# Patient Record
Sex: FEMALE | Race: WHITE | NOT HISPANIC OR LATINO | Employment: STUDENT | RURAL
[De-identification: names, ages, dates, MRNs, and addresses within clinical notes are randomized per-mention and may not be internally consistent; named-entity substitution may affect disease eponyms.]

---

## 2019-08-14 ENCOUNTER — OFFICE VISIT (OUTPATIENT)
Dept: FAMILY MEDICINE CLINIC | Facility: CLINIC | Age: 34
End: 2019-08-14

## 2019-08-14 VITALS
BODY MASS INDEX: 20.94 KG/M2 | WEIGHT: 125.7 LBS | OXYGEN SATURATION: 98 % | HEART RATE: 70 BPM | RESPIRATION RATE: 20 BRPM | SYSTOLIC BLOOD PRESSURE: 100 MMHG | DIASTOLIC BLOOD PRESSURE: 60 MMHG | TEMPERATURE: 98.6 F | HEIGHT: 65 IN

## 2019-08-14 DIAGNOSIS — Z13.29 SCREENING FOR THYROID DISORDER: ICD-10-CM

## 2019-08-14 DIAGNOSIS — Z13.220 SCREENING FOR LIPOID DISORDERS: ICD-10-CM

## 2019-08-14 DIAGNOSIS — J30.2 SEASONAL ALLERGIES: ICD-10-CM

## 2019-08-14 DIAGNOSIS — S62.661A CLOSED NONDISPLACED FRACTURE OF DISTAL PHALANX OF LEFT INDEX FINGER, INITIAL ENCOUNTER: ICD-10-CM

## 2019-08-14 DIAGNOSIS — L98.9 SKIN LESION: ICD-10-CM

## 2019-08-14 DIAGNOSIS — S69.92XA JAMMED INTERPHALANGEAL JOINT OF FINGER OF LEFT HAND, INITIAL ENCOUNTER: ICD-10-CM

## 2019-08-14 DIAGNOSIS — Z00.00 WELL ADULT EXAM: Primary | ICD-10-CM

## 2019-08-14 DIAGNOSIS — Z13.1 SCREENING FOR DIABETES MELLITUS: ICD-10-CM

## 2019-08-14 DIAGNOSIS — R12 HEARTBURN: ICD-10-CM

## 2019-08-14 DIAGNOSIS — F41.9 ANXIETY: ICD-10-CM

## 2019-08-14 PROCEDURE — 99203 OFFICE O/P NEW LOW 30 MIN: CPT | Performed by: NURSE PRACTITIONER

## 2019-08-14 RX ORDER — ESCITALOPRAM OXALATE 10 MG/1
10 TABLET ORAL DAILY
Qty: 90 TABLET | Refills: 1 | Status: SHIPPED | OUTPATIENT
Start: 2019-08-14 | End: 2020-07-24

## 2019-08-14 RX ORDER — CETIRIZINE HYDROCHLORIDE 10 MG/1
10 TABLET ORAL DAILY
Qty: 30 TABLET | Refills: 5 | Status: SHIPPED | OUTPATIENT
Start: 2019-08-14 | End: 2020-04-30 | Stop reason: SDUPTHER

## 2019-08-14 RX ORDER — DIPHENHYDRAMINE HCL 25 MG
25 TABLET ORAL WEEKLY
COMMUNITY

## 2019-08-23 ENCOUNTER — TELEPHONE (OUTPATIENT)
Dept: FAMILY MEDICINE CLINIC | Facility: CLINIC | Age: 34
End: 2019-08-23

## 2019-08-23 NOTE — TELEPHONE ENCOUNTER
----- Message from ELIZABETH Eldridge sent at 8/18/2019 10:32 PM CDT -----  Nondisplaced intra-articular fracture of the lateral aspect base of the distal phalanx of the index finger.  We need to call orthopedic and see if they can see her in the [facility] ASAP.

## 2019-08-23 NOTE — TELEPHONE ENCOUNTER
----- Message from ELIZABETH Eldridge sent at 8/18/2019 10:32 PM CDT -----  Nondisplaced intra-articular fracture of the lateral aspect base of the distal phalanx of the index finger.  We need to call orthopedic and see if they can see her in the [facility] ASAP.

## 2019-08-23 NOTE — TELEPHONE ENCOUNTER
----- Message from ELIZABETH Eldridge sent at 8/18/2019 10:32 PM CDT -----  Nondisplaced intra-articular fracture of the lateral aspect base of the distal phalanx of the index finger.  We need to call orthopedic and see if they can see her in the [facility] ASAP.

## 2019-08-23 NOTE — TELEPHONE ENCOUNTER
----- Message from ELIZABETH Eldridge sent at 8/18/2019 10:32 PM CDT -----  Nondisplaced intra-articular fracture of the lateral aspect base of the distal phalanx of the index finger.  We need to call orthopedic and see if they can see her in the [facility] ASAP.

## 2019-08-26 DIAGNOSIS — R52 PAIN: Primary | ICD-10-CM

## 2019-08-27 ENCOUNTER — OFFICE VISIT (OUTPATIENT)
Dept: ORTHOPEDIC SURGERY | Facility: CLINIC | Age: 34
End: 2019-08-27

## 2019-08-27 VITALS — WEIGHT: 124 LBS | HEIGHT: 65 IN | BODY MASS INDEX: 20.66 KG/M2

## 2019-08-27 DIAGNOSIS — M79.645 FINGER PAIN, LEFT: ICD-10-CM

## 2019-08-27 DIAGNOSIS — S62.661A NONDISPLACED FRACTURE OF DISTAL PHALANX OF LEFT INDEX FINGER, INITIAL ENCOUNTER FOR CLOSED FRACTURE: Primary | ICD-10-CM

## 2019-08-27 PROCEDURE — 99214 OFFICE O/P EST MOD 30 MIN: CPT | Performed by: NURSE PRACTITIONER

## 2019-08-27 PROCEDURE — 26750 TREAT FINGER FRACTURE EACH: CPT | Performed by: NURSE PRACTITIONER
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
## 2019-08-28 ENCOUNTER — HOSPITAL ENCOUNTER (OUTPATIENT)
Dept: PHYSICAL THERAPY | Facility: HOSPITAL | Age: 34
Setting detail: THERAPIES SERIES
Discharge: HOME | End: 2019-08-28

## 2019-08-28 DIAGNOSIS — S62.661A NONDISPLACED FRACTURE OF DISTAL PHALANX OF LEFT INDEX FINGER, INITIAL ENCOUNTER FOR CLOSED FRACTURE: Primary | ICD-10-CM

## 2019-08-28 DIAGNOSIS — M79.645 FINGER PAIN, LEFT: ICD-10-CM

## 2019-08-28 PROCEDURE — 97110 THERAPEUTIC EXERCISES: CPT | Performed by: PHYSICAL THERAPIST

## 2019-08-28 PROCEDURE — 97162 PT EVAL MOD COMPLEX 30 MIN: CPT | Performed by: PHYSICAL THERAPIST
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
## 2019-08-29 ENCOUNTER — HOSPITAL ENCOUNTER (OUTPATIENT)
Dept: PHYSICAL THERAPY | Facility: HOSPITAL | Age: 34
Discharge: HOME | End: 2019-08-29
Admitting: NURSE PRACTITIONER

## 2019-08-29 DIAGNOSIS — M79.645 FINGER PAIN, LEFT: ICD-10-CM

## 2019-08-29 DIAGNOSIS — S62.661A NONDISPLACED FRACTURE OF DISTAL PHALANX OF LEFT INDEX FINGER, INITIAL ENCOUNTER FOR CLOSED FRACTURE: Primary | ICD-10-CM

## 2019-08-29 PROCEDURE — 97110 THERAPEUTIC EXERCISES: CPT

## 2019-08-29 NOTE — THERAPY TREATMENT NOTE
Outpatient Physical Therapy Ortho Treatment Note  [facility]     Patient Name: Ana Martinez  : 1985  MRN: [id number]  Today's Date: 2019      Visit Date: 2019  Pt reports 5/10 pain pre treatment, 0/10 pain post treatment  Reports 0% of improvement.  Attended 2/2 visits.  Insurance available:Precert after 20 visits   Next MD appt: TBOLIVIER .  Recertification: 2019.  Visit Dx:    ICD-10-CM ICD-9-CM   1. Nondisplaced fracture of distal phalanx of left index finger, initial encounter for closed fracture S62.661A 816.02   2. Finger pain, left M79.645 729.5       Patient Active Problem List   Diagnosis   • Finger pain, left   • Nondisplaced fracture of distal phalanx of left index finger, initial encounter for closed fracture        Past Medical History:   Diagnosis Date   • Anxiety    • Cold sore    • Heartburn         Past Surgical History:   Procedure Laterality Date   • SPLENECTOMY     • TUBAL ABDOMINAL LIGATION         PT Ortho     Row Name 19 1400       Subjective Comments    Subjective Comments  pt stated pain work her up during the night. pt reported that she has been doing HEP . Pt came in 15 mins late for appt today. Pt worked over per patient.  -TL       Precautions and Contraindications    Precautions/Limitations  no known precautions/limitations  -TL       Subjective Pain    Able to rate subjective pain?  yes  -TL    Pre-Treatment Pain Level  5  -TL    Post-Treatment Pain Level  0  -TL    Row Name 19 1700        Strength Right    # Reps  2  -AJ    Right Rung  4  -AJ    Right  Test 1  50  -AJ    Right  Test 2  53  -AJ     Strength Average Right  51.5  -AJ        Strength Left    # Reps  2  -AJ    Left Rung  4  -AJ    Left  Test 1  15  -AJ    Left  Test 2  12  -AJ     Strength Average Left  13.5  -AJ    Row Name 19 1600       Subjective Comments    Subjective Comments  Patient wishes to not have long term  "affects in the finger and be able ot bend it again.  -AJ       Precautions and Contraindications    Precautions/Limitations  no known precautions/limitations  -       Subjective Pain    Able to rate subjective pain?  yes  -AJ    Pre-Treatment Pain Level  2  -AJ    Post-Treatment Pain Level  -- \"sore\"  -       Posture/Observations    Posture- WNL  Posture is WNL for B hands  -AJ    Posture/Observations Comments  No distress, guarding of L IF, keeps it straight out.  -AJ       Special Tests/Palpation    Special Tests/Palpation  -- No areas of TTP.  -AJ       Left Upper Ext    Lt Wrist Flexion AROM  82°  -AJ    Lt Wrist Extension AROM  78°  -AJ    Lt  Ulnar Deviation AROM  42°  with increase in pain.  -AJ    Lt  Radial Deviation AROM  22°  -AJ       Left Fingers    LT Fingers PIP Flexion AROM  65°  -AJ    LT Fingers DIP Flexion AROM  35°  -AJ    LT FINGERS COMMENTS  All measuremeants fpor L hand WNL, except index finger which is listed above.  -       MMT (Manual Muscle Testing)    General MMT Comments  B wrists 5/5.  -AJ        Strength Right    # Reps  2  -AJ    Right Rung  2  -AJ    Right  Test 1  70  -AJ    Right  Test 2  66  -AJ     Strength Average Right  68  -AJ        Strength Left    # Reps  2  -AJ    Left Rung  2  -AJ    Left  Test 1  20  -AJ    Left  Test 2  17  -AJ     Strength Average Left  18.5  -AJ       Sensation    Sensation WNL?  WNL  -AJ    Light Touch  No apparent deficits  -AJ    Additional Comments  Denies any numbness or tingling.  -       Transfers    Comment (Transfers)  I with all transfers.  -       Gait/Stairs Assessment/Training    Comment (Gait/Stairs)  FWB, non-antalgicg, normla arm swing with gait.  -AJ       Hand  Strength     Strength Affected Side  Bilateral  -      User Key  (r) = Recorded By, (t) = Taken By, (c) = Cosigned By    Initials Name Provider Type    AJ Mesha Pope, PT DPT Physical Therapist    Angie Fung " J, PTA Physical Therapy Assistant           Hand Therapy (last 24 hours)      Hand Eval     Row Name 08/28/19 1600             Right Hand Strength - Pinch (lbs)    Lateral  19 lbs  -AJ      Three Jaw Eulalio  21 lbs  -AJ      Tip Pinch - Index Finger  14 lbs  -AJ         Left Hand Strength - Pinch (lbs)    Lateral  12 lbs  -AJ      Three Jaw Eulalio  14 lbs  -AJ      Tip Pinch - Index Finger  7 lbs  -AJ        User Key  (r) = Recorded By, (t) = Taken By, (c) = Cosigned By    Initials Name Provider Type    Mesha Alicea, PT DPT Physical Therapist                    PT Assessment/Plan     Row Name 08/29/19 1400 08/28/19 1600       PT Assessment    Functional Limitations  --  Limitation in home management;Limitations in community activities;Performance in leisure activities;Performance in self-care ADL;Performance in work activities  -    Impairments  --  Dexterity;Endurance;Impaired flexibility;Impaired muscle endurance;Range of motion;Sensation;Pain;Muscle strength;Joint mobility;Impaired muscle power  -    Assessment Comments  No new goals met at this time. pt reported that she has been doing her HEP. Pt stated finger woke her up at night since starting ex. Encourage pt to use ice to help eith decrease pain. pt tolerated new ex this date of wrist flex/ext stretches , RD/UD arom. PTA added pinching to HEP with RTB. Pt tolerated pro ll UE well.   -TL  Patient exhibits difficulty with functional activites and decreas ein ROM,strength and would beenfit form PT.  -    Rehab Potential  --  Excellent  -    Patient/caregiver participated in establishment of treatment plan and goals  --  Yes  -    Patient would benefit from skilled therapy intervention  --  Yes  -       PT Plan    PT Frequency  2x/week  -TL  2x/week  -    Predicted Duration of Therapy Intervention (Therapy Eval)  --  6-12 visits  -    Planned CPT's?  --  PT EVAL LOW COMPLEXITY: 33002;PT RE-EVAL: 76265;PT THER PROC EA 15 MIN: 29529;PT  "THER ACT EA 15 MIN: 92007;PT MANUAL THERAPY EA 15 MIN: 55879;PT PARAFFIN BATH: 68782;PT THER SUPP EA 15 MIN  -AJ    Physical Therapy Interventions (Optional Details)  --  fine motor skills;gross motor skills;home exercise program;joint mobilization;manual therapy techniques;modalities;patient/family education;ROM (Range of Motion);strengthening;stretching  -    PT Plan Comments  add web flex next visit and continue with ROm.  -TL  progress overall ROM, strength, function.  -      User Key  (r) = Recorded By, (t) = Taken By, (c) = Cosigned By    Initials Name Provider Type    Mesha Alicea, PT DPT Physical Therapist    Angie Fung PTA Physical Therapy Assistant          Modalities     Row Name 08/29/19 1400 08/28/19 1600          Moist Heat    MH Prior to Rx  --  No  -AJ     MH S/P Rx  --  No  -AJ        Ice    Ice Applied  Yes  -TL  --     Location  left Index finger  -TL  --     Rx Minutes  15 mins  -TL  --     Ice S/P Rx  Yes  -TL  --       User Key  (r) = Recorded By, (t) = Taken By, (c) = Cosigned By    Initials Name Provider Type    Mesha Alicea, PT DPT Physical Therapist    Angie Fung PTA Physical Therapy Assistant        Exercises     Row Name 08/29/19 1400 08/28/19 1600          Subjective Comments    Subjective Comments  pt stated pain work her up during the night. pt reported that she has been doing HEP . Pt came in 15 mins late for appt today. Pt worked over per patient.  -TL  Patient wishes to not have long term affects in the finger and be able ot bend it again.  -        Subjective Pain    Able to rate subjective pain?  yes  -TL  yes  -AJ     Pre-Treatment Pain Level  5  -TL  2  -AJ     Post-Treatment Pain Level  0  -TL  -- \"sore\"  -        Exercise 1    Exercise Name 1  pro ll UE fwd/rev  -TL  L IF DIP blocking AROM  -AJ     Reps 1  --  20  -AJ     Time 1  10mins   -TL  3-5\" hold  -AJ     Additional Comments  gripping and rom/level 3  -TL  --        " "Exercise 2    Exercise Name 2  Wrist flexion S  -TL  L IF PIP blocking AROM  -AJ     Reps 2  2  -TL  20  -AJ     Time 2  1 min each  -TL  3-5\" hold  -AJ        Exercise 3    Exercise Name 3  wrisit ext S  -TL  Claws  -AJ     Reps 3  2  -TL  20  -AJ     Time 3  1 min  -TL  5\" hold  -AJ        Exercise 4    Exercise Name 4  Arom wrist RD  -TL  Fists  -AJ     Sets 4  2  -TL  20  -AJ     Reps 4  10  -TL  5\" hold  -AJ     Time 4  5 sec hold  -TL  --        Exercise 5    Exercise Name 5  AROM wrist UD  -TL  Putty:   -AJ     Sets 5  2  -TL  --     Reps 5  10  -TL  --     Time 5  --  5 minutes  -AJ     Additional Comments  --  Red  -AJ        Exercise 6    Exercise Name 6  L IF pip blocking   -TL  --     Reps 6  20  -TL  --     Time 6  3-5 sec hold  -TL  --        Exercise 7    Exercise Name 7  claws  -TL  --     Reps 7  20  -TL  --     Time 7  5 sec hold  -TL  --     Additional Comments  reviewed  -TL  --        Exercise 8    Exercise Name 8  fisting  -TL  --     Reps 8  20  -TL  --     Time 8  5 sec hold  -TL  --     Additional Comments  review  -TL  --        Exercise 9    Exercise Name 9  pinching activity clothes pins  -TL  --     Time 9  3mins  -TL  --        Exercise 10    Exercise Name 10  fisting putty  -TL  --     Time 10  5min  -TL  --     Additional Comments  red  -TL  --        Exercise 11    Exercise Name 11  pinching with red putty  -TL  --     Time 11  3min  -TL  --       User Key  (r) = Recorded By, (t) = Taken By, (c) = Cosigned By    Initials Name Provider Type    Mesha Alicea, PT DPT Physical Therapist    TL Angie Kelley, PTA Physical Therapy Assistant                       PT OP Goals     Row Name 08/29/19 1400 08/28/19 1600       PT Short Term Goals    STG Date to Achieve  09/18/19  -TL  09/18/19  -AJ    STG 1  I with HEP and have additoins/changes by next recertification.  -TL  I with HEP and have additoins/changes by next recertification.  -AJ    STG 1 Progress  " Ongoing;Progressing  -TL  --    STG 2  AROM L wrist all WNL, no increase in pain.  -TL  AROM L wrist all WNL, no increase in pain.  -AJ    STG 2 Progress  Ongoing  -TL  --    STG 3  AROm L IF PIP flexion >= 90°.  -TL  AROm L IF PIP flexion >= 90°.  -    STG 3 Progress  Ongoing  -TL  --    STG 4  AROm L IF DIP flexion >= 65°.  -TL  AROm L IF DIP flexion >= 65°.  -    STG 4 Progress  Ongoing  -TL  --    STG 5  L  at the #2 setting >= 45#.  -TL  L  at the #2 setting >= 45#.  -    STG 5 Progress  Ongoing  -TL  --       Long Term Goals    LTG Date to Achieve  10/11/19  -TL  10/11/19  -    LTG 1  patient able to make a full composite fist with l ahnd and able to touch tip to fat pad with IF.  -TL  patient able to make a full composite fist with l ahnd and able to touch tip to fat pad with IF.  -    LTG 2  L  at the #2 setting >= 65#.  -TL  L  at the #2 setting >= 65#.  -    LTG 3  L  at the #4 setting >= 40#.  -TL  L  at the #4 setting >= 40#.  -    LTG 4  L lateral pinch within 2# of contralateral hand.  -TL  L lateral pinch within 2# of contralateral hand.  -    LTG 5  L 3-jaw lateral pinch within 2# of contralateral hand.  -TL  L 3-jaw lateral pinch within 2# of contralateral hand.  -    LTG 6  L tip lateral pinch within 2# of contralateral hand.  -TL  L tip lateral pinch within 2# of contralateral hand.  -    LTG 7  Patient able to perform vibration activites with no increase in pain.  -TL  Patient able to perform vibration activites with no increase in pain.  -    LTG 8  I with final HEP.  -TL  I with final HEP.  -    LTG 9  D/C with a final HEp and free 30 day fitness formula memembership.  -TL  D/C with a final HEp and free 30 day fitness formula memembership.  -       Time Calculation    PT Goal Re-Cert Due Date  09/18/19  -TL  09/18/19  -      User Key  (r) = Recorded By, (t) = Taken By, (c) = Cosigned By    Initials Name Provider Type    Mesha Alicea  Garth, PT DPT Physical Therapist    TL Angie Kelley, JT Physical Therapy Assistant          Therapy Education  Education Details: wrist flexion and ext s, putty pinch  Given: HEP, Symptoms/condition management, Pain management  Program: New, Reinforced  How Provided: Verbal, Demonstration, Written  Provided to: Patient  Level of Understanding: Teach back education performed, Verbalized, Demonstrated              Time Calculation:   Start Time: 1358  Stop Time: 1453  Time Calculation (min): 55 min  PT Non-Billable Time (min): 10 min  Total Timed Code Minutes- PT: 45 minute(s)  Therapy Charges for Today     Code Description Service Date Service Provider Modifiers Qty    [id number]  PT THER PROC EA 15 MIN 8/29/2019 Angie Kelley, JT GP 3    [id number] HC PT THER SUPP EA 15 MIN 8/29/2019 Angie Kelley PTA GP 1                    Angie Kelley PTA  8/29/2019

## 2019-09-03 NOTE — PROGRESS NOTES
Subjective   Ana Martinez is a 34 y.o. female.     She presents today to [facility] care.  She does have a few concerns today in the office.  She reports that she is suffering from allergy symptoms and would like something to help with this if possible today in the office.  She has previously been taking Claritin but it has not been beneficial for her symptoms.  She also suffers from chronic anxiety symptoms.  She is interested in something she can take daily to help manage the symptoms.  She also has complaints of heartburn symptoms today in the office.  She would like a referral to dermatology for his full skin assessment.  She also has complaints of pain and swelling in her left index finger.  She reports that she jammed her finger a few weeks ago, however, the pain has remained the same.  She would like to have this evaluated today in the office.  She is due for repeat routine fasting labs.  She is otherwise without any other new complaints today in the office.      Anxiety   Presents for initial visit. Onset was more than 5 years ago. The problem has been unchanged. Symptoms include excessive worry, insomnia, irritability, nervous/anxious behavior, panic and restlessness. Patient reports no chest pain, compulsions, decreased concentration, depressed mood, dizziness, dry mouth, feeling of choking, hyperventilation, impotence, malaise, muscle tension, nausea, obsessions, palpitations, shortness of breath or suicidal ideas. Symptoms occur constantly. The severity of symptoms is interfering with daily activities. The quality of sleep is fair.     Her past medical history is significant for anxiety/panic attacks. Compliance with prior treatments has been variable.   Hand Pain    The incident occurred more than 1 week ago. The injury mechanism was a direct blow. The pain is present in the left fingers. The quality of the pain is described as aching. The pain does not radiate. The pain is mild. The pain has  been constant since the incident. Pertinent negatives include no chest pain, muscle weakness, numbness or tingling. The symptoms are aggravated by movement and palpation. The treatment provided no relief.        The following portions of the patient's history were reviewed and updated as appropriate: allergies, current medications, past family history, past medical history, past social history, past surgical history and problem list.    Review of Systems   Constitutional: Positive for irritability.   HENT: Positive for congestion and sneezing.    Eyes: Negative.    Respiratory: Negative.  Negative for shortness of breath.    Cardiovascular: Negative.  Negative for chest pain and palpitations.   Gastrointestinal: Negative.  Negative for nausea.   Genitourinary: Negative for impotence.   Musculoskeletal: Negative.    Skin: Negative.    Allergic/Immunologic: Negative.    Neurological: Negative.  Negative for dizziness, tingling and numbness.   Hematological: Negative.    Psychiatric/Behavioral: Positive for stress. Negative for decreased concentration, suicidal ideas and depressed mood. The patient is nervous/anxious and has insomnia.        Objective   Physical Exam   Constitutional: She is oriented to person, place, and time. Vital signs are normal. She appears well-developed and well-nourished. No distress.   HENT:   Head: Normocephalic.   Right Ear: External ear normal.   Left Ear: External ear normal.   Nose: Congestion present.   Mouth/Throat: Oropharynx is clear and moist. No oropharyngeal exudate.   Eyes: Conjunctivae and EOM are normal. Pupils are equal, round, and reactive to light. Right eye exhibits no discharge. Left eye exhibits no discharge.   Neck: Normal range of motion. Neck supple. No tracheal deviation present. No thyromegaly present.   Cardiovascular: Normal rate, regular rhythm and normal heart sounds. Exam reveals no gallop and no friction rub.   No murmur heard.  Pulmonary/Chest: Effort normal  and breath sounds normal. No respiratory distress. She has no wheezes. She has no rales. She exhibits no tenderness.   Musculoskeletal: Normal range of motion.        Left hand: She exhibits tenderness, bony tenderness and swelling.   Lymphadenopathy:     She has no cervical adenopathy.   Neurological: She is alert and oriented to person, place, and time.   Skin: Skin is warm and dry. Capillary refill takes less than 2 seconds. No rash noted. She is not diaphoretic. No erythema. No pallor.   Psychiatric: She has a normal mood and affect. Her behavior is normal. Judgment and thought content normal.   Nursing note and vitals reviewed.        Assessment/Plan   Ana was seen today for establish care and anxiety.    Diagnoses and all orders for this visit:    Well adult exam  -     CBC & Differential  -     Comprehensive Metabolic Panel  -     Hemoglobin A1c  -     Lipid Panel  -     TSH  -     Vitamin D 25 Hydroxy    Jammed interphalangeal joint of finger of left hand, initial encounter  -     XR Hand 3+ View Left; Future    Anxiety  -     CBC & Differential  -     Comprehensive Metabolic Panel  -     Hemoglobin A1c  -     Lipid Panel  -     TSH  -     Vitamin D 25 Hydroxy    Heartburn  -     escitalopram (LEXAPRO) 10 MG tablet; Take 1 tablet by mouth Daily.    Screening for diabetes mellitus  -     Hemoglobin A1c    Screening for lipoid disorders  -     Lipid Panel    Screening for thyroid disorder  -     TSH    Seasonal allergies  -     cetirizine (zyrTEC) 10 MG tablet; Take 1 tablet by mouth Daily.    Skin lesion  -     Ambulatory Referral to Dermatology    Closed nondisplaced fracture of distal phalanx of left index finger, initial encounter  -     Ambulatory Referral to Orthopedic Surgery               Patient's Body mass index is 20.92 kg/m². BMI is within normal parameters. No follow-up required..    Fasting labs.  X-ray following office visit.  Referral to dermatology for a skin assessment.  Zyrtec daily for  allergy symptoms.  Start on Lexapro once daily for anxiety.  Referral to orthopedics for nondisplaced fracture of the distal phalanx.  Continue current medications.  Follow up in 4 weeks for routine follow up.  Follow up sooner for problems/concerns.  Patient verbalized understanding and agreement with plan of care.        This document has been electronically signed by ELIZABETH Eldridge on September 2, 2019 10:39 PM

## 2019-09-04 ENCOUNTER — TREATMENT (OUTPATIENT)
Dept: PHYSICAL THERAPY | Facility: CLINIC | Age: 34
End: 2019-09-04

## 2019-09-04 DIAGNOSIS — S62.661A NONDISPLACED FRACTURE OF DISTAL PHALANX OF LEFT INDEX FINGER, INITIAL ENCOUNTER FOR CLOSED FRACTURE: Primary | ICD-10-CM

## 2019-09-04 DIAGNOSIS — M79.645 FINGER PAIN, LEFT: ICD-10-CM

## 2019-09-04 PROCEDURE — 97110 THERAPEUTIC EXERCISES: CPT | Performed by: PHYSICAL THERAPIST

## 2019-09-04 NOTE — PROGRESS NOTES
Physical Therapy Daily Progress Note    Patient: Ana Martinez   : 1985  Diagnosis/ICD-10 Code:     Diagnosis Plan   1. Nondisplaced fracture of distal phalanx of left index finger, initial encounter for closed fracture     2. Finger pain, left       Referring practitioner: ELIZABETH Regan  Date of Initial Visit: Type: THERAPY  Noted: 2019  Today's Date: 2019  Patient seen for 3 sessions         Ana Martinez reports: patient states that she is really hoping that she doesn't have to attend therapy for too many more visits. 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 reports that she does fel like her finger is doing much better.      Subjective Questionnaire:       Subjective Evaluation    History of Present Illness    Subjective comment: patient states that she is really hoping that she doesn't have to attend therapy for too many more visits. 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 reports that she does fel like her finger is doing much better. Pain  Current pain ratin           Precautions/ Contraindications: none noted        Objective       Active Range of Motion     Left Wrist   Wrist flexion: 90 degrees WFL  Wrist extension: 76 degrees WFL  Radial deviation: 30 degrees WFL  Ulnar deviation: 50 degrees WFL      Left Digits    Flexion   Index     PIP: 110 degrees    DIP: 75 degrees    Strength/Myotome Testing     Left Wrist/Hand      (2nd hand position)     Trial 1: 50 lbs    Trial 2: 65 lbs    Trial 3: 45 lbs    Average: 53.33 lbs    Thumb Strength  Key/Lateral Pinch     Trial 1: 13 lbs    Trial 2: 13 lbs    Trial 3: 13 lbs    Average: 13 lbs  Tip/Two-Point Pinch     Trial 1: 8 lbs    Trial 2: 9 lbs    Trial 3: 10 lbs    Average: 9 lbs  Palmar/Three-Point Pinch     Trial 1: 16 lbs    Trial 2: 15 lbs    Trial 3: 16 lbs    Average: 15.67 lbs    Right Wrist/Hand      (2nd  hand position)     Trial 1: 70 lbs    Trial 2: 67 lbs    Trial 3: 72 lbs    Average: 69.67 lbs    Thumb Strength   Key/Lateral Pinch     Trial 1: 22 lbs    Trial 2: 21 lbs    Trial 3: 19 lbs    Average: 20.67 lbs  Tip/Two-Point Pinch     Trial 1: 16 lbs    Trial 2: 14 lbs    Trial 3: 15 lbs    Average: 15 lbs  Palmar/Three-Point Pinch     Trial 1: 20 lbs    Trial 2: 24 lbs    Trial 3: 20 lbs    Average: 21.33 lbs    Additional Strength Details  Left  #4 setting   35#, 20#, 35# (average 30#)    Right  #4 setting  40#, 45#, 60# (average 48.3#)    General Comments     Wrist/Hand Comments  Full composite fist closure with left index finger tip to fat pad     See Exercise, Manual, and Modality Logs for complete treatment.       Assessment & Plan     Assessment  Assessment details: All STG's are met at this time. Good effort throughout. Does have improved strength but still with a deficit. Patient demonstrating independence with current HEP. Has normal wrist AROM at this time.     Goals  Plan Goals: Short Term Goals  STG Date to Achieve  09/18/19        STG 1  I with HEP and have additoins/changes by next recertification.(met)      STG 2  AROM L wrist all WNL, no increase in pain.  (met)      STG 3  AROm L IF PIP flexion >= 90°. (met)        STG 4  AROm L IF DIP flexion >= 65°. (met)        STG 5  L  at the #2 setting >= 45#. (met)              Long Term Goals  LTG Date to Achieve  10/11/19      LTG 1  patient able to make a full composite fist with l ahnd and able to touch tip to fat pad with IF. (met)         LTG 2  L  at the #2 setting >= 65#.         LTG 3  L  at the #4 setting >= 40#.         LTG 4  L lateral pinch within 2# of contralateral hand.         LTG 5  L 3-jaw lateral pinch within 2# of contralateral hand.         LTG 6  L tip lateral pinch within 2# of contralateral hand.         LTG 7  Patient able to perform vibration activites with no increase in pain.        LTG 8  I with final  HEP.        LTG 9  D/C with a final HEp and free 30 day fitness formula mememberip.           Plan  Plan details: Next visit continue  strengthening and pinch strengthening      Other: Next visit continue  strengthening and pinch strengthening            Timed:  Manual Therapy:         mins  84940;  Therapeutic Exercise:    50     mins  00638;     Neuromuscular Efren:        mins  53705;    Therapeutic Activity:          mins  65860;     Gait Training:           mins  09913;     Ultrasound:          mins  10809;    Electrical Stimulation:         mins  56822 ( );    Untimed:  Electrical Stimulation:         mins  81548 ( );  Mechanical Traction:         mins  48408;     Timed Treatment:   50   mins   Total Treatment:     50   mins  Niurka Fong PTA  Physical Therapist Assistant

## 2019-09-05 ENCOUNTER — TREATMENT (OUTPATIENT)
Dept: PHYSICAL THERAPY | Facility: CLINIC | Age: 34
End: 2019-09-05

## 2019-09-05 DIAGNOSIS — S62.661A NONDISPLACED FRACTURE OF DISTAL PHALANX OF LEFT INDEX FINGER, INITIAL ENCOUNTER FOR CLOSED FRACTURE: Primary | ICD-10-CM

## 2019-09-05 DIAGNOSIS — M79.645 FINGER PAIN, LEFT: ICD-10-CM

## 2019-09-05 PROCEDURE — 97110 THERAPEUTIC EXERCISES: CPT | Performed by: PHYSICAL THERAPIST

## 2019-09-05 NOTE — PROGRESS NOTES
Physical Therapy Daily Progress Note      Patient: Ana Martinez   : 1985  Referring practitioner: ELIZABETH Regan  Date of Initial Visit: Type: THERAPY  Noted: 2019  Today's Date: 2019  Patient seen for 4 sessions         Ana Martinez reports: some improvement        Subjective     Objective   See Exercise, Manual, and Modality Logs for complete treatment.       Assessment & Plan       Goals  Plan Goals: Goals  Plan Goals: Short Term Goals  STG Date to Achieve   19                                   STG 1   I with HEP and have additoins/changes by next recertification.(met)                STG 2   AROM L wrist all WNL, no increase in pain.             (met)                STG 3   AROm L IF PIP flexion >= 90°. (met)                             STG 4   AROm L IF DIP flexion >= 65°. (met)                             STG 5   L  at the #2 setting >= 45#. (met)                                                                                                    Long Term Goals  LTG Date to Achieve   10/11/19                LTG 1   patient able to make a full composite fist with l ahnd and able to touch tip to fat pad with IF. (met)                             LTG 2   L  at the #2 setting >= 65#.   Ongoing/progressing                       LTG 3   L  at the #4 setting >= 40#. Ongoing/progressing                             LTG 4   L lateral pinch within 2# of contralateral ongoing/progressing hand.ongoing/progressing                             LTG 5   L 3-jaw lateral pinch within 2# of contralateral hand. Ongoing/progressing                             LTG 6   L tip lateral pinch within 2# of contralateral hand. Ongoing/progressing                             LTG 7   Patient able to perform vibration activites with no increase in pain.                             LTG 8   I with final HEP.                             LTG 9   D/C with a final HEp and free 30 day fitness  formula [facility].                           Plan  Plan details: Hammering the Bosu and therabar taps on table.        Visit Diagnoses:    ICD-10-CM ICD-9-CM   1. Nondisplaced fracture of distal phalanx of left index finger, initial encounter for closed fracture S62.661A 816.02   2. Finger pain, left M79.645 729.5       Progress per Plan of Care and Progress strengthening /stabilization /functional activity           Timed:  Manual Therapy:         mins  13344;  Therapeutic Exercise:     44   mins  54032;     Neuromuscular Efren:        mins  44629;    Therapeutic Activity:          mins  00536;     Gait Training:           mins  41099;     Ultrasound:          mins  98533;    Electrical Stimulation:         mins  50921 ( );    Untimed:  Electrical Stimulation:         mins  49919 ( );  Mechanical Traction:         mins  32401;     Timed Treatment:   44   mins   Total Treatment:    54    mins  Angie Kelley PTA  Physical Therapist

## 2019-09-09 ENCOUNTER — OFFICE VISIT (OUTPATIENT)
Dept: PHYSICAL THERAPY | Facility: CLINIC | Age: 34
End: 2019-09-09

## 2019-09-09 DIAGNOSIS — S62.661A NONDISPLACED FRACTURE OF DISTAL PHALANX OF LEFT INDEX FINGER, INITIAL ENCOUNTER FOR CLOSED FRACTURE: Primary | ICD-10-CM

## 2019-09-09 DIAGNOSIS — M79.645 FINGER PAIN, LEFT: ICD-10-CM

## 2019-09-09 PROCEDURE — 97110 THERAPEUTIC EXERCISES: CPT | Performed by: PHYSICAL THERAPIST

## 2019-09-09 NOTE — PROGRESS NOTES
Physical Therapy Daily Progress Note      Patient: Ana Martinez   : 1985  Referring practitioner: ELIZABETH Regan  Date of Initial Visit: Type: THERAPY  Noted: 2019  Today's Date: 2019  Patient seen for 5 sessions  Next MD appt: OCt  2019.  Recertification: 2019       Ana Martinez reports: Pt reports that she is 80%        Subjective     Objective       Strength/Myotome Testing     Left Wrist/Hand      (2nd hand position)     Trial 1: 64 lbs    Trial 2: 58 lbs    Trial 3: 52 lbs    Average: 58 lbs    Thumb Strength  Key/Lateral Pinch     Trial 1: 16 lbs    Trial 2: 16 lbs    Trial 3: 16 lbs    Average: 16 lbs    Right Wrist/Hand     Thumb Strength   Key/Lateral Pinch     Trial 1: 22 lbs    Trial 2: 22 lbs    Trial 3: 22 lbs    Average: 22 lbs    Additional Strength Details  Pt had rung 4 , 39 trial 1,41 trial 2,39 trial 3=40 average.     See Exercise, Manual, and Modality Logs for complete treatment.       Assessment & Plan     Assessment  Assessment details: Pt has met all short term goals and LTG#1,3 and 7. Pt still has some tenderness on the medial lateral aspect to the index finger dip. Pt progressing with  goals. Pt also tolerated vibration feel without pain using an object.    Goals  Plan Goals: Goals  Plan Goals: Goals  Plan Goals: Short Term Goals  STG Date to Achieve   19                                   STG 1   I with HEP and have additoins/changes by next recertification.(met)                STG 2   AROM L wrist all WNL, no increase in pain.             (met)                STG 3   AROm L IF PIP flexion >= 90°. (met)                             STG 4   AROm L IF DIP flexion >= 65°. (met)                             STG 5   L  at the #2 setting >= 45#. (met)                                                                                                    Long Term Goals  LTG Date to Achieve   10/11/19                LTG 1   patient able to make  a full composite fist with l ahnd and able to touch tip to fat pad with IF. (met)                             LTG 2   L  at the #2 setting >= 65#.   Ongoing/progressing                     LTG 3   L  at the #4 setting >= 40#. (met) at 40#                             LTG 4   L lateral pinch within 2# of contralateral ongoing/progressing hand.ongoing/progressing                             LTG 5   L 3-jaw lateral pinch within 2# of contralateral hand. Ongoing/progressing                             LTG 6   L tip lateral pinch within 2# of contralateral hand. Ongoing/progressing                             LTG 7   Patient able to perform vibration activites with no increase in pain.(met)                             LTG 8   I with final HEP.                             LTG 9   D/C with a final HEp and free 30 day fitness formula [employer]    Plan  Plan details: Continue to work toward unmet goals. Measure 3 finger jaw pinch        Visit Diagnoses:    ICD-10-CM ICD-9-CM   1. Nondisplaced fracture of distal phalanx of left index finger, initial encounter for closed fracture S62.661A 816.02   2. Finger pain, left M79.645 729.5       Other: See POC           Timed:  Manual Therapy:        mins  53519;  Therapeutic Exercise:      45   mins  08458;     Neuromuscular Efren:        mins  83819;    Therapeutic Activity:          mins  39601;     Gait Training:           mins  42215;     Ultrasound:          mins  82259;    Electrical Stimulation:         mins  03823 ( );    Untimed:  Electrical Stimulation:         mins  43195 ( );  Mechanical Traction:         mins  78654;     Timed Treatment:   45   mins   Total Treatment:    60    mins  Angie Kelley PTA  Physical Therapist

## 2019-09-12 ENCOUNTER — OFFICE VISIT (OUTPATIENT)
Dept: PHYSICAL THERAPY | Facility: CLINIC | Age: 34
End: 2019-09-12

## 2019-09-12 DIAGNOSIS — S62.661A NONDISPLACED FRACTURE OF DISTAL PHALANX OF LEFT INDEX FINGER, INITIAL ENCOUNTER FOR CLOSED FRACTURE: Primary | ICD-10-CM

## 2019-09-12 DIAGNOSIS — M79.645 FINGER PAIN, LEFT: ICD-10-CM

## 2019-09-12 PROCEDURE — 97110 THERAPEUTIC EXERCISES: CPT | Performed by: PHYSICAL THERAPIST

## 2019-09-12 NOTE — PROGRESS NOTES
Physical Therapy Daily Progress Note/Discharge    Patient: Ana Martinez   : 1985  Diagnosis/ICD-10 Code:     Diagnosis Plan   1. Nondisplaced fracture of distal phalanx of left index finger, initial encounter for closed fracture     2. Finger pain, left       Referring practitioner: ELIZABETH Regan  Date of Initial Visit: Type: THERAPY  Noted: 2019  Today's Date: 2019  Patient seen for 6 sessions      PT Recheck Due: 2019  PT MD Visit: TBD       Ana Martinez reports: 70% improvement      Subjective Evaluation    History of Present Illness    Subjective comment: doing well. knows that her  and pinch strength are still less but feels like she is ready for discharge and able to continue this on her own independently. Pain  Current pain ratin             Objective       Strength/Myotome Testing     Left Wrist/Hand      (2nd hand position)     Trial 1: 60 lbs    Trial 2: 55 lbs    Trial 3: 58 lbs    Average: 57.67 lbs    Thumb Strength  Key/Lateral Pinch     Trial 1: 14 lbs  Tip/Two-Point Pinch     Trial 1: 11 lbs  Palmar/Three-Point Pinch     Trial 1: 17 lbs    Right Wrist/Hand      (2nd hand position)     Trial 1: 75 lbs    Trial 2: 70 lbs    Trial 3: 72 lbs    Average: 72.33 lbs    Thumb Strength   Key/Lateral Pinch     Trial 1: 22 lbs  Tip/Two-Point Pinch     Trial 1: 16 lbs  Palmar/Three-Point Pinch     Trial 1: 27 lbs     See Exercise, Manual, and Modality Logs for complete treatment.       Assessment & Plan     Assessment  Assessment details: Patient has met all goals at this time regarding flexibility and mobility. Only goals remain are  and pinch strength goals. Patient feels like she can gain her strength given time, but feels she is ready for discharge at this time. Is independent in her HEP at this time and has good compliance.     Goals  Plan Goals: Short Term Goals                               STG 1   I with HEP and have additoins/changes by next  recertification.(met)                STG 2   AROM L wrist all WNL, no increase in pain.             (met)                STG 3   AROm L IF PIP flexion >= 90°. (met)                             STG 4   AROm L IF DIP flexion >= 65°. (met)                             STG 5   L  at the #2 setting >= 45#. (met)                                                                                                    Long Term Goals                LTG 1   patient able to make a full composite fist with l ahnd and able to touch tip to fat pad with IF. (met)                             LTG 2   L  at the #2 setting >= 65#.   Ongoing/progressing                     LTG 3   L  at the #4 setting >= 40#. (met) at 40#                             LTG 4   L lateral pinch within 2# of contralateral ongoing/progressing hand.ongoing/progressing                             LTG 5   L 3-jaw lateral pinch within 2# of contralateral hand. Ongoing/progressing                             LTG 6   L tip lateral pinch within 2# of contralateral hand. Ongoing/progressing                             LTG 7   Patient able to perform vibration activites with no increase in pain.(met)                              LTG 8   I with final HEP. (met)                             LTG 9   D/C with a final HEp and free 30 day fitness formula [facility] (met)    Plan  Plan details: Discharge to independent management at this time      Other Discharge to independent management            Timed:  Manual Therapy:         mins  02913;  Therapeutic Exercise:    55     mins  75904;     Neuromuscular Efren:        mins  08948;    Therapeutic Activity:          mins  68282;     Gait Training:           mins  14587;     Ultrasound:          mins  13488;    Electrical Stimulation:         mins  16948 ( );    Untimed:  Electrical Stimulation:         mins  88010 ( );  Mechanical Traction:         mins  62328;     Timed Treatment:   55   mins   Total  Treatment:     55   mins  Niurka Fong, JT  Physical Therapist Assistant

## 2019-10-07 DIAGNOSIS — S62.661A NONDISPLACED FRACTURE OF DISTAL PHALANX OF LEFT INDEX FINGER, INITIAL ENCOUNTER FOR CLOSED FRACTURE: Primary | ICD-10-CM

## 2020-04-30 ENCOUNTER — TELEPHONE (OUTPATIENT)
Dept: FAMILY MEDICINE CLINIC | Facility: CLINIC | Age: 35
End: 2020-04-30

## 2020-04-30 DIAGNOSIS — J30.2 SEASONAL ALLERGIES: ICD-10-CM

## 2020-04-30 RX ORDER — CETIRIZINE HYDROCHLORIDE 10 MG/1
10 TABLET ORAL DAILY
Qty: 30 TABLET | Refills: 5 | Status: SHIPPED | OUTPATIENT
Start: 2020-04-30 | End: 2021-06-16 | Stop reason: SDUPTHER

## 2020-04-30 NOTE — TELEPHONE ENCOUNTER
PT CALLED AND STATED SHE HAS AN APPOINTMENT NEXT Friday FOR A POSSIBLE YEAST INFECTION. PT WANTED TO KNOW IF SHE COULD BE SEEN SOONER OR IF SOMETHING COULD BE CALLED IN TO HELP WITH THE DISCOMFORT UNTIL HER APPOINTMENT.    PLEASE ADVISE PT.    PHARMACY: [facility] [place] [address] AT [facility] [address] - [phone]  - [phone]  [phone]    PT'S CALLBACK NUMBER: [phone]

## 2020-04-30 NOTE — TELEPHONE ENCOUNTER
Called and offered appointment but she stated that she is going out of town and would not be available.

## 2020-07-24 ENCOUNTER — LAB (OUTPATIENT)
Dept: LAB | Facility: HOSPITAL | Age: 35
End: 2020-07-24

## 2020-07-24 ENCOUNTER — OFFICE VISIT (OUTPATIENT)
Dept: FAMILY MEDICINE CLINIC | Facility: CLINIC | Age: 35
End: 2020-07-24

## 2020-07-24 VITALS
SYSTOLIC BLOOD PRESSURE: 100 MMHG | BODY MASS INDEX: 21.38 KG/M2 | WEIGHT: 128.3 LBS | DIASTOLIC BLOOD PRESSURE: 60 MMHG | HEIGHT: 65 IN | TEMPERATURE: 99.1 F | HEART RATE: 72 BPM | RESPIRATION RATE: 20 BRPM | OXYGEN SATURATION: 94 %

## 2020-07-24 DIAGNOSIS — N89.8 VAGINAL DISCHARGE: ICD-10-CM

## 2020-07-24 DIAGNOSIS — R63.4 UNINTENTIONAL WEIGHT LOSS: ICD-10-CM

## 2020-07-24 DIAGNOSIS — Z01.419 ENCOUNTER FOR GYNECOLOGICAL EXAMINATION: Primary | ICD-10-CM

## 2020-07-24 DIAGNOSIS — Z13.29 SCREENING FOR THYROID DISORDER: ICD-10-CM

## 2020-07-24 DIAGNOSIS — Z13.1 SCREENING FOR DIABETES MELLITUS: ICD-10-CM

## 2020-07-24 LAB
CANDIDA ALBICANS: NEGATIVE
GARDNERELLA VAGINALIS: POSITIVE
T VAGINALIS DNA VAG QL PROBE+SIG AMP: NEGATIVE

## 2020-07-24 PROCEDURE — 80053 COMPREHEN METABOLIC PANEL: CPT | Performed by: NURSE PRACTITIONER

## 2020-07-24 PROCEDURE — 84439 ASSAY OF FREE THYROXINE: CPT | Performed by: NURSE PRACTITIONER

## 2020-07-24 PROCEDURE — 83036 HEMOGLOBIN GLYCOSYLATED A1C: CPT | Performed by: NURSE PRACTITIONER

## 2020-07-24 PROCEDURE — 82306 VITAMIN D 25 HYDROXY: CPT | Performed by: NURSE PRACTITIONER

## 2020-07-24 PROCEDURE — 87660 TRICHOMONAS VAGIN DIR PROBE: CPT | Performed by: NURSE PRACTITIONER

## 2020-07-24 PROCEDURE — 84443 ASSAY THYROID STIM HORMONE: CPT | Performed by: NURSE PRACTITIONER

## 2020-07-24 PROCEDURE — 99395 PREV VISIT EST AGE 18-39: CPT | Performed by: NURSE PRACTITIONER

## 2020-07-24 PROCEDURE — 84481 FREE ASSAY (FT-3): CPT | Performed by: NURSE PRACTITIONER

## 2020-07-24 PROCEDURE — 87510 GARDNER VAG DNA DIR PROBE: CPT | Performed by: NURSE PRACTITIONER

## 2020-07-24 PROCEDURE — 82607 VITAMIN B-12: CPT | Performed by: NURSE PRACTITIONER

## 2020-07-24 PROCEDURE — 85025 COMPLETE CBC W/AUTO DIFF WBC: CPT | Performed by: NURSE PRACTITIONER

## 2020-07-24 PROCEDURE — 87480 CANDIDA DNA DIR PROBE: CPT | Performed by: NURSE PRACTITIONER

## 2020-07-25 LAB
25(OH)D3 SERPL-MCNC: 30.7 NG/ML (ref 30–100)
ALBUMIN SERPL-MCNC: 4.4 G/DL (ref 3.5–5.2)
ALBUMIN/GLOB SERPL: 1.6 G/DL
ALP SERPL-CCNC: 46 U/L (ref 39–117)
ALT SERPL W P-5'-P-CCNC: 14 U/L (ref 1–33)
ANION GAP SERPL CALCULATED.3IONS-SCNC: 7.8 MMOL/L (ref 5–15)
AST SERPL-CCNC: 15 U/L (ref 1–32)
BASOPHILS # BLD AUTO: 0.07 10*3/MM3 (ref 0–0.2)
BASOPHILS NFR BLD AUTO: 0.8 % (ref 0–1.5)
BILIRUB SERPL-MCNC: 0.3 MG/DL (ref 0–1.2)
BUN SERPL-MCNC: 11 MG/DL (ref 6–20)
BUN/CREAT SERPL: 12.1 (ref 7–25)
CALCIUM SPEC-SCNC: 9.8 MG/DL (ref 8.6–10.5)
CHLORIDE SERPL-SCNC: 102 MMOL/L (ref 98–107)
CO2 SERPL-SCNC: 30.2 MMOL/L (ref 22–29)
CREAT SERPL-MCNC: 0.91 MG/DL (ref 0.57–1)
DEPRECATED RDW RBC AUTO: 46.8 FL (ref 37–54)
EOSINOPHIL # BLD AUTO: 0.67 10*3/MM3 (ref 0–0.4)
EOSINOPHIL NFR BLD AUTO: 7.6 % (ref 0.3–6.2)
ERYTHROCYTE [DISTWIDTH] IN BLOOD BY AUTOMATED COUNT: 13.2 % (ref 12.3–15.4)
GFR SERPL CREATININE-BSD FRML MDRD: 71 ML/MIN/1.73
GLOBULIN UR ELPH-MCNC: 2.8 GM/DL
GLUCOSE SERPL-MCNC: 60 MG/DL (ref 65–99)
HBA1C MFR BLD: 4.8 % (ref 4.8–5.6)
HCT VFR BLD AUTO: 44 % (ref 34–46.6)
HGB BLD-MCNC: 14.7 G/DL (ref 12–15.9)
IMM GRANULOCYTES # BLD AUTO: 0.02 10*3/MM3 (ref 0–0.05)
IMM GRANULOCYTES NFR BLD AUTO: 0.2 % (ref 0–0.5)
LYMPHOCYTES # BLD AUTO: 3.26 10*3/MM3 (ref 0.7–3.1)
LYMPHOCYTES NFR BLD AUTO: 37.1 % (ref 19.6–45.3)
MCH RBC QN AUTO: 32.1 PG (ref 26.6–33)
MCHC RBC AUTO-ENTMCNC: 33.4 G/DL (ref 31.5–35.7)
MCV RBC AUTO: 96.1 FL (ref 79–97)
MONOCYTES # BLD AUTO: 0.88 10*3/MM3 (ref 0.1–0.9)
MONOCYTES NFR BLD AUTO: 10 % (ref 5–12)
NEUTROPHILS NFR BLD AUTO: 3.89 10*3/MM3 (ref 1.7–7)
NEUTROPHILS NFR BLD AUTO: 44.3 % (ref 42.7–76)
NRBC BLD AUTO-RTO: 0 /100 WBC (ref 0–0.2)
PLATELET # BLD AUTO: 381 10*3/MM3 (ref 140–450)
PMV BLD AUTO: 11.4 FL (ref 6–12)
POTASSIUM SERPL-SCNC: 4.9 MMOL/L (ref 3.5–5.2)
PROT SERPL-MCNC: 7.2 G/DL (ref 6–8.5)
RBC # BLD AUTO: 4.58 10*6/MM3 (ref 3.77–5.28)
SODIUM SERPL-SCNC: 140 MMOL/L (ref 136–145)
T3FREE SERPL-MCNC: 2.8 PG/ML (ref 2–4.4)
T4 FREE SERPL-MCNC: 1.12 NG/DL (ref 0.93–1.7)
TSH SERPL DL<=0.05 MIU/L-ACNC: 4.59 UIU/ML (ref 0.27–4.2)
VIT B12 BLD-MCNC: 590 PG/ML (ref 211–946)
WBC # BLD AUTO: 8.79 10*3/MM3 (ref 3.4–10.8)

## 2020-07-28 ENCOUNTER — TELEPHONE (OUTPATIENT)
Dept: FAMILY MEDICINE CLINIC | Facility: CLINIC | Age: 35
End: 2020-07-28

## 2020-07-28 DIAGNOSIS — B96.89 BV (BACTERIAL VAGINOSIS): Primary | ICD-10-CM

## 2020-07-28 DIAGNOSIS — N76.0 BV (BACTERIAL VAGINOSIS): Primary | ICD-10-CM

## 2020-07-28 RX ORDER — METRONIDAZOLE 7.5 MG/G
1 GEL VAGINAL NIGHTLY
Qty: 70 G | Refills: 0 | Status: SHIPPED | OUTPATIENT
Start: 2020-07-28 | End: 2020-08-02

## 2020-07-28 NOTE — TELEPHONE ENCOUNTER
Ana called wanting to know if you can send something in for her the BV please she is very uncomfortable. She has seen her lab results.

## 2020-07-28 NOTE — TELEPHONE ENCOUNTER
Patient called and wanted to have Vivian go over lab results with her. Patient also wanted to have Rx called in.     [facility] pharmacy confirmed.     Please call patient and advise @ [phone].

## 2020-07-29 ENCOUNTER — TELEPHONE (OUTPATIENT)
Dept: FAMILY MEDICINE CLINIC | Facility: CLINIC | Age: 35
End: 2020-07-29

## 2020-07-29 DIAGNOSIS — N76.0 BV (BACTERIAL VAGINOSIS): Primary | ICD-10-CM

## 2020-07-29 DIAGNOSIS — B96.89 BV (BACTERIAL VAGINOSIS): Primary | ICD-10-CM

## 2020-07-29 DIAGNOSIS — R79.89 ABNORMAL THYROID BLOOD TEST: ICD-10-CM

## 2020-07-29 RX ORDER — SACCHAROMYCES BOULARDII 250 MG
250 CAPSULE ORAL 2 TIMES DAILY
Qty: 60 CAPSULE | Refills: 11 | Status: SHIPPED | OUTPATIENT
Start: 2020-07-29 | End: 2021-06-16

## 2020-07-29 NOTE — TELEPHONE ENCOUNTER
----- Message from ELIZABETH Eldridge sent at 7/29/2020  8:24 AM CDT -----  Her glucose was just a touch low.  I would recommend increasing the protein in her diet.  Her TSH is also elevated.  Has she been taking biotin or hair, skin, and nail vitamins.  This can cause an elevated TSH.  If not, we need her to come back by the lab and have additional thyroid tests drawn.  Please be sure she is aware that I will not be in the office next week due to vacation, so the results may be a bit delayed.  Otherwise her labs look good.

## 2020-07-31 LAB
LAB AP CASE REPORT: NORMAL
PATH INTERP SPEC-IMP: NORMAL

## 2020-08-04 ENCOUNTER — TELEPHONE (OUTPATIENT)
Dept: FAMILY MEDICINE CLINIC | Facility: CLINIC | Age: 35
End: 2020-08-04

## 2020-08-04 ENCOUNTER — DOCUMENTATION (OUTPATIENT)
Dept: FAMILY MEDICINE CLINIC | Facility: CLINIC | Age: 35
End: 2020-08-04

## 2020-08-04 RX ORDER — HYDROCORTISONE 25 MG/G
1 CREAM TOPICAL 2 TIMES DAILY
Qty: 1 EACH | Refills: 3 | Status: SHIPPED | OUTPATIENT
Start: 2020-08-04 | End: 2020-12-16 | Stop reason: SDUPTHER

## 2020-08-04 NOTE — TELEPHONE ENCOUNTER
----- Message from ELIZABETH Eldridge sent at 7/31/2020  3:13 PM CDT -----  They were unable to determine her PAP results due to lubricant.  We will need to repeat her PAP smear at her convenience.  We can retest her for BV at that time.    Pt was informed of the above message. Pt stated she was to have something sent in for Hemorrhoids but has not received anything.

## 2020-08-06 ENCOUNTER — LAB (OUTPATIENT)
Dept: LAB | Facility: HOSPITAL | Age: 35
End: 2020-08-06

## 2020-08-06 PROCEDURE — 84443 ASSAY THYROID STIM HORMONE: CPT | Performed by: NURSE PRACTITIONER

## 2020-08-06 PROCEDURE — 86376 MICROSOMAL ANTIBODY EACH: CPT | Performed by: NURSE PRACTITIONER

## 2020-08-06 PROCEDURE — 86800 THYROGLOBULIN ANTIBODY: CPT | Performed by: NURSE PRACTITIONER

## 2020-08-06 PROCEDURE — 84481 FREE ASSAY (FT-3): CPT | Performed by: NURSE PRACTITIONER

## 2020-08-06 PROCEDURE — 84439 ASSAY OF FREE THYROXINE: CPT | Performed by: NURSE PRACTITIONER

## 2020-08-07 LAB
T3FREE SERPL-MCNC: 2.39 PG/ML (ref 2–4.4)
T4 FREE SERPL-MCNC: 1.13 NG/DL (ref 0.93–1.7)
TSH SERPL DL<=0.05 MIU/L-ACNC: 3.27 UIU/ML (ref 0.27–4.2)

## 2020-08-10 LAB
THYROGLOB AB SERPL-ACNC: 1.2 IU/ML (ref 0–0.9)
THYROPEROXIDASE AB SERPL-ACNC: 280 IU/ML (ref 0–34)

## 2020-08-12 ENCOUNTER — TELEPHONE (OUTPATIENT)
Dept: FAMILY MEDICINE CLINIC | Facility: CLINIC | Age: 35
End: 2020-08-12

## 2020-08-12 DIAGNOSIS — R63.4 UNINTENTIONAL WEIGHT LOSS: Primary | ICD-10-CM

## 2020-08-12 RX ORDER — AMITRIPTYLINE HYDROCHLORIDE 25 MG/1
25 TABLET, FILM COATED ORAL NIGHTLY
Qty: 30 TABLET | Refills: 1 | Status: SHIPPED | OUTPATIENT
Start: 2020-08-12 | End: 2021-06-16

## 2020-08-12 NOTE — TELEPHONE ENCOUNTER
----- Message from ELIZABETH Eldridge sent at 8/9/2020  8:35 PM CDT -----  Repeat thyroid labs are normal.    Pt wanted me to remind you that since her labs were normal if hudson would prescribe her something for weight gain.

## 2020-08-16 NOTE — PROGRESS NOTES
Subjective   Ana Martinez is a 35 y.o. female.     She presents today for her well woman exam.  She does have complaints of recurrent vaginal discharge.  She reports that she has been diagnosed with recurrent bacterial vaginosis.  She is interested in something to help with this if possible.  She also has concerns with recurrent hemorrhoids.  She would like something to help with this if possible.  She has used over-the-counter Preparation H with minimal relief of symptoms.  She would also like to discuss her weight.  She reports that she is unable to maintain any significant weight gain.  She is interested in something to help with this.  She is otherwise without any other new complaints.    Gynecologic Exam   The patient's primary symptoms include a genital odor and vaginal discharge. The patient's pertinent negatives include no genital itching, genital lesions, genital rash, missed menses, pelvic pain or vaginal bleeding. This is a recurrent problem. The patient is experiencing no pain. She is not pregnant. Pertinent negatives include no abdominal pain, anorexia, back pain, chills, constipation, diarrhea, discolored urine, dysuria, fever, flank pain, frequency, headaches, hematuria, joint pain, joint swelling, nausea, painful intercourse, rash, sore throat, urgency or vomiting. There has been no bleeding. She has not been passing clots. She has not been passing tissue. The treatment provided no relief. She is sexually active. No, her partner does not have an STD. Her menstrual history has been regular.   Hemorrhoids   This is a recurrent problem. The current episode started more than 1 month ago. The problem occurs intermittently. The problem has been waxing and waning. Associated symptoms include a change in bowel habit. Pertinent negatives include no abdominal pain, anorexia, arthralgias, chest pain, chills, congestion, coughing, diaphoresis, fatigue, fever, headaches, joint swelling, myalgias, nausea,  neck pain, numbness, rash, sore throat, swollen glands, urinary symptoms, vertigo, visual change, vomiting or weakness. The treatment provided no relief.        The following portions of the patient's history were reviewed and updated as appropriate: allergies, current medications, past family history, past medical history, past social history, past surgical history and problem list.    Review of Systems   Constitutional: Positive for unexpected weight loss. Negative for chills, diaphoresis, fatigue and fever.   HENT: Negative.  Negative for congestion, sore throat and swollen glands.    Eyes: Negative.    Respiratory: Negative.  Negative for cough.    Cardiovascular: Negative.  Negative for chest pain.   Gastrointestinal: Positive for change in bowel habit and hemorrhoids. Negative for abdominal pain, anorexia, constipation, diarrhea, nausea and vomiting.   Genitourinary: Positive for vaginal discharge. Negative for dysuria, flank pain, frequency, hematuria, missed menses, pelvic pain and urgency.   Musculoskeletal: Negative.  Negative for arthralgias, back pain, joint pain, joint swelling, myalgias and neck pain.   Skin: Negative.  Negative for rash.   Allergic/Immunologic: Negative.    Neurological: Negative.  Negative for vertigo, weakness and numbness.   Hematological: Negative.    Psychiatric/Behavioral: Negative.        Objective   Physical Exam   Constitutional: She is oriented to person, place, and time. Vital signs are normal. She appears well-developed and well-nourished. No distress.   HENT:   Head: Normocephalic.   Right Ear: External ear normal.   Left Ear: External ear normal.   Nose: Nose normal.   Mouth/Throat: Oropharynx is clear and moist. No oropharyngeal exudate.   Eyes: Pupils are equal, round, and reactive to light. Conjunctivae and EOM are normal. Right eye exhibits no discharge. Left eye exhibits no discharge.   Neck: Normal range of motion. Neck supple. No tracheal deviation present. No  thyromegaly present.   Cardiovascular: Normal rate, regular rhythm and normal heart sounds. Exam reveals no gallop and no friction rub.   No murmur heard.  Pulmonary/Chest: Effort normal and breath sounds normal. No respiratory distress. She has no wheezes. She has no rales. She exhibits no tenderness. Right breast exhibits no inverted nipple, no mass, no nipple discharge, no skin change and no tenderness. Left breast exhibits no inverted nipple, no mass, no nipple discharge, no skin change and no tenderness. No breast swelling, tenderness, discharge or bleeding. Breasts are symmetrical.   Genitourinary: Uterus normal. Pelvic exam was performed with patient supine. No labial fusion. There is no rash, tenderness, lesion, injury or Bartholin's cyst on the right labia. There is no rash, tenderness, lesion, injury or Bartholin's cyst on the left labia. Cervix exhibits pinkness. Cervix does not exhibit motion tenderness, discharge, friability, lesion, polyp, nabothian cyst, eversion or cyanosis. Right adnexum displays no mass, no tenderness and no fullness. Left adnexum displays no mass, no tenderness and no fullness. Vagina exhibits no lesion and no loss of rugae. No erythema, tenderness or bleeding in the vagina. No foreign body in the vagina. No signs of injury around the vagina. Vaginal discharge found. No cystocele or rectocele present.  Musculoskeletal: Normal range of motion.   Lymphadenopathy:     She has no cervical adenopathy.   Neurological: She is alert and oriented to person, place, and time.   Skin: Skin is warm and dry. Capillary refill takes less than 2 seconds. No rash noted. She is not diaphoretic. No erythema. No pallor.   Psychiatric: She has a normal mood and affect. Her behavior is normal. Judgment and thought content normal.   Nursing note and vitals reviewed.        Assessment/Plan   Ana was seen today for gynecologic exam and hemorrhoids.    Diagnoses and all orders for this  visit:    Encounter for gynecological examination  -     Liquid-based Pap Smear, Screening    Unintentional weight loss  -     Cancel: CBC & Differential  -     Cancel: Comprehensive Metabolic Panel  -     Cancel: Hemoglobin A1c  -     T4, Free  -     T3, Free  -     Cancel: TSH  -     Vitamin B12  -     Cancel: Vitamin D 25 Hydroxy    Screening for diabetes mellitus  -     Cancel: CBC & Differential  -     Cancel: Comprehensive Metabolic Panel  -     Cancel: Hemoglobin A1c  -     T4, Free  -     T3, Free  -     Cancel: TSH  -     Vitamin B12  -     Cancel: Vitamin D 25 Hydroxy    Screening for thyroid disorder  -     Cancel: CBC & Differential  -     Cancel: Comprehensive Metabolic Panel  -     Cancel: Hemoglobin A1c  -     T4, Free  -     T3, Free  -     Cancel: TSH  -     Vitamin B12  -     Cancel: Vitamin D 25 Hydroxy    Vaginal discharge  -     Gardnerella vaginalis, Trichomonas vaginalis, Candida albicans, DNA - Swab, Vagina               Patient's Body mass index is 21.35 kg/m². BMI is within normal parameters. No follow-up required..    We will notify the patient with her results when they become available.  Lab following office visit.  Continue current medications.  Follow up as scheduled for routine follow up.  Follow up sooner for problems/concerns.  Patient verbalized understanding and agreement with plan of care.        This document has been electronically signed by ELIZABETH Eldridge on August 16, 2020 13:56

## 2020-08-24 ENCOUNTER — TELEPHONE (OUTPATIENT)
Dept: FAMILY MEDICINE CLINIC | Facility: CLINIC | Age: 35
End: 2020-08-24

## 2020-08-24 NOTE — TELEPHONE ENCOUNTER
----- Message from ELIZABETH Eldridge sent at 8/12/2020  5:19 PM CDT -----  Thyroid antibodies are abnormal, but as long as her TSH and other thyroid function is normal she does not require medication.  I did send Elavil in to help with her appetitie.  We will need to repeat thyroid levels every 3-6 months.

## 2020-08-24 NOTE — TELEPHONE ENCOUNTER
----- Message from ELIZABETH Eldridge sent at 8/12/2020  5:19 PM CDT -----  Thyroid antibodies are abnormal, but as long as her TSH and other thyroid function is normal she does not require medication.  I did send Elavil in to help with her appetitie.  We will need to repeat thyroid levels every 3-6 months.    Pt wants to know if you can remove a skin tag on her inner thigh at her next appointment.

## 2020-08-25 NOTE — TELEPHONE ENCOUNTER
Patient has an in office procedure already scheduled added note about possible removal of skin tag

## 2020-09-10 ENCOUNTER — PROCEDURE VISIT (OUTPATIENT)
Dept: FAMILY MEDICINE CLINIC | Facility: CLINIC | Age: 35
End: 2020-09-10

## 2020-09-10 VITALS
HEIGHT: 65 IN | OXYGEN SATURATION: 99 % | TEMPERATURE: 97.8 F | HEART RATE: 102 BPM | RESPIRATION RATE: 20 BRPM | DIASTOLIC BLOOD PRESSURE: 60 MMHG | BODY MASS INDEX: 22.73 KG/M2 | SYSTOLIC BLOOD PRESSURE: 100 MMHG | WEIGHT: 136.4 LBS

## 2020-09-10 DIAGNOSIS — B96.89 BV (BACTERIAL VAGINOSIS): Primary | ICD-10-CM

## 2020-09-10 DIAGNOSIS — L98.9 SKIN LESION: ICD-10-CM

## 2020-09-10 DIAGNOSIS — N76.0 BV (BACTERIAL VAGINOSIS): Primary | ICD-10-CM

## 2020-09-10 DIAGNOSIS — Z12.4 ENCOUNTER FOR REPEAT PAPANICOLAOU SMEAR OF CERVIX: ICD-10-CM

## 2020-09-10 DIAGNOSIS — L29.9 ITCHING: ICD-10-CM

## 2020-09-10 DIAGNOSIS — L98.9 LESION OF SKIN OF SCALP: ICD-10-CM

## 2020-09-10 PROCEDURE — 99395 PREV VISIT EST AGE 18-39: CPT | Performed by: NURSE PRACTITIONER

## 2020-09-10 PROCEDURE — 17110 DESTRUCTION B9 LES UP TO 14: CPT | Performed by: NURSE PRACTITIONER

## 2020-09-10 RX ORDER — METRONIDAZOLE 7.5 MG/G
GEL VAGINAL NIGHTLY
Qty: 70 G | Refills: 0 | Status: SHIPPED | OUTPATIENT
Start: 2020-09-10 | End: 2020-09-15

## 2020-09-10 NOTE — PROGRESS NOTES
"Procedure   Cryotherapy, Skin Lesion  Date/Time: 9/10/2020 11:00 AM  Performed by: Vivian Sr APRN  Authorized by: Vivian Sr APRN   Consent: Verbal consent obtained. Written consent obtained.  Consent given by: patient  Patient understanding: patient states understanding of the procedure being performed  Patient consent: the patient's understanding of the procedure matches consent given  Procedure consent: procedure consent matches procedure scheduled  Relevant documents: relevant documents present and verified  Patient identity confirmed: verbally with patient and provided demographic data  Time out: Immediately prior to procedure a \"time out\" was called to verify the correct patient, procedure, equipment, support staff and site/side marked as required.  Local anesthesia used: no    Anesthesia:  Local anesthesia used: no    Sedation:  Patient sedated: no    Patient tolerance: Patient tolerated the procedure well with no immediate complications  Comments: Cryotherapy applied to benign skin lesion in the right leg fold (near vagina) x 3 treatments (five seconds each treatment).  Patient tolerated well.  Discussed signs and symptoms of infection.  Advised the area will turn red and blister.  Do not manipulate blister.  Will open on it's own.  May apply neosporin pain relief as needed.  Follow up for any problems.  Patient tolerated well.  Verbalized understanding and agreement with plan of care.           "

## 2020-09-10 NOTE — PROGRESS NOTES
Subjective   Ana Martinez is a 35 y.o. female.     She presents today for her repeat Pap smear.  She does have continued complaints of recurrent vaginal discharge.  She reports that she has been diagnosed with recurrent bacterial vaginosis.  She is interested in something to help with this if possible.  She would like to have a skin lesion removed from her inner thigh.  She also has complaints of a skin lesion on her scalp.  She has been suffering from a recurrent itching of her scalp.  She is otherwise without any other new complaints.    Gynecologic Exam  The patient's primary symptoms include a genital odor and vaginal discharge. The patient's pertinent negatives include no genital itching, genital lesions, genital rash, missed menses, pelvic pain or vaginal bleeding. This is a recurrent problem. The patient is experiencing no pain. She is not pregnant. Pertinent negatives include no discolored urine, dysuria, frequency, hematuria, joint swelling, painful intercourse, rash or urgency. There has been no bleeding. She has not been passing clots. She has not been passing tissue. The treatment provided no relief. She is sexually active. No, her partner does not have an STD. Her menstrual history has been regular.        The following portions of the patient's history were reviewed and updated as appropriate: allergies, current medications, past family history, past medical history, past social history, past surgical history and problem list.    Review of Systems   Constitutional: Negative.    HENT: Negative.  Negative for swollen glands.    Eyes: Negative.    Respiratory: Negative.    Cardiovascular: Negative.    Gastrointestinal: Positive for hemorrhoids.   Genitourinary: Positive for vaginal discharge. Negative for dysuria, frequency, hematuria, missed menses, pelvic pain and urgency.   Musculoskeletal: Negative.    Skin: Positive for skin lesions. Negative for rash.        Pruritus of the scalp.    Allergic/Immunologic: Negative.    Neurological: Negative.    Hematological: Negative.    Psychiatric/Behavioral: Negative.        Objective   Physical Exam   Constitutional: She is oriented to person, place, and time. Vital signs are normal. She appears well-developed and well-nourished. No distress.   HENT:   Head: Normocephalic.   Right Ear: External ear normal.   Left Ear: External ear normal.   Nose: Nose normal.   Mouth/Throat: Oropharynx is clear and moist. No oropharyngeal exudate.   Eyes: Pupils are equal, round, and reactive to light. Conjunctivae are normal. Right eye exhibits no discharge. Left eye exhibits no discharge.   Neck: Normal range of motion. Neck supple. No tracheal deviation present. No thyromegaly present.   Cardiovascular: Normal rate, regular rhythm and normal heart sounds. Exam reveals no gallop and no friction rub.   No murmur heard.  Pulmonary/Chest: Effort normal and breath sounds normal. No respiratory distress. She has no wheezes. She has no rales. She exhibits no tenderness.   Musculoskeletal: Normal range of motion.   Lymphadenopathy:     She has no cervical adenopathy.   Neurological: She is alert and oriented to person, place, and time.   Skin: Skin is warm and dry. Capillary refill takes less than 2 seconds. Lesion and rash noted. She is not diaphoretic. No erythema. No pallor.   Psychiatric: Her behavior is normal. Judgment and thought content normal.   Nursing note and vitals reviewed.        Assessment/Plan   Ana was seen today for gynecologic exam.    Diagnoses and all orders for this visit:    BV (bacterial vaginosis)  -     metroNIDAZOLE (METROGEL VAGINAL) 0.75 % vaginal gel; Insert  into the vagina Every Night for 5 days.    Itching  -     pyrithione zinc (Selsun Blue Dry Scalp) 1 % shampoo; Apply  topically to the appropriate area as directed Daily As Needed for Itching.    Lesion of skin of scalp  -     Ambulatory Referral to Dermatology    Skin lesion  -      Cryotherapy, Skin Lesion    Encounter for repeat Papanicolaou smear of cervix  -     Liquid-based Pap Smear, Screening               Patient's Body mass index is 22.7 kg/m². BMI is within normal parameters. No follow-up required..    Cryotherapy applied to benign skin lesion in the right leg fold (near vagina) x 3 treatments (five seconds each treatment).  Patient tolerated well.  Discussed signs and symptoms of infection.  Advised the area will turn red and blister.  Do not manipulate blister.  Will open on it's own.  May apply neosporin pain relief as needed.  Follow up for any problems.  Patient tolerated well.  Verbalized understanding and agreement with plan of care.  Referral to dermatology.  We will notify the patient with her results when they become available.  Selsun shampoo for dry scalp.  MetroGel nightly for 5 nights.  Continue all other current medications.  Follow up as scheduled for routine follow up.  Follow up sooner for problems/concerns.  Patient verbalized understanding and agreement with plan of care.        This document has been electronically signed by ELIZABETH Eldridge on September 10, 2020 11:11

## 2020-09-16 ENCOUNTER — TELEPHONE (OUTPATIENT)
Dept: FAMILY MEDICINE CLINIC | Facility: CLINIC | Age: 35
End: 2020-09-16

## 2020-09-16 RX ORDER — FLUCONAZOLE 150 MG/1
150 TABLET ORAL ONCE
Qty: 1 TABLET | Refills: 0 | Status: SHIPPED | OUTPATIENT
Start: 2020-09-16 | End: 2020-09-16

## 2020-09-18 LAB
LAB AP CASE REPORT: NORMAL
PATH INTERP SPEC-IMP: NORMAL

## 2020-09-22 ENCOUNTER — TELEPHONE (OUTPATIENT)
Dept: FAMILY MEDICINE CLINIC | Facility: CLINIC | Age: 35
End: 2020-09-22

## 2020-12-16 ENCOUNTER — PATIENT MESSAGE (OUTPATIENT)
Dept: FAMILY MEDICINE CLINIC | Facility: CLINIC | Age: 35
End: 2020-12-16

## 2020-12-16 DIAGNOSIS — N94.6 MENSES PAINFUL: Primary | ICD-10-CM

## 2020-12-16 RX ORDER — HYDROCORTISONE 25 MG/G
1 CREAM TOPICAL 2 TIMES DAILY
Qty: 1 EACH | Refills: 3 | Status: SHIPPED | OUTPATIENT
Start: 2020-12-16 | End: 2021-01-21 | Stop reason: SDUPTHER

## 2020-12-16 NOTE — TELEPHONE ENCOUNTER
From: Ana Martinez  To: ELIZABETH Eldridge  Sent: 12/16/2020 2:40 PM CST  Subject: Non-Urgent Medical Question    I can go to obgyn in [place] if you do a referral. Thank you very much for the quick response.

## 2021-01-21 RX ORDER — HYDROCORTISONE 25 MG/G
1 CREAM TOPICAL 2 TIMES DAILY
Qty: 1 EACH | Refills: 3 | Status: SHIPPED | OUTPATIENT
Start: 2021-01-21 | End: 2021-06-15 | Stop reason: SDUPTHER

## 2021-01-28 ENCOUNTER — OFFICE VISIT (OUTPATIENT)
Dept: OBSTETRICS AND GYNECOLOGY | Facility: CLINIC | Age: 36
End: 2021-01-28

## 2021-01-28 VITALS
DIASTOLIC BLOOD PRESSURE: 64 MMHG | BODY MASS INDEX: 23.49 KG/M2 | WEIGHT: 141 LBS | SYSTOLIC BLOOD PRESSURE: 106 MMHG | HEIGHT: 65 IN

## 2021-01-28 DIAGNOSIS — N89.8 VAGINAL DISCHARGE: ICD-10-CM

## 2021-01-28 DIAGNOSIS — N92.0 MENORRHAGIA WITH REGULAR CYCLE: Primary | ICD-10-CM

## 2021-01-28 PROCEDURE — 99214 OFFICE O/P EST MOD 30 MIN: CPT | Performed by: NURSE PRACTITIONER

## 2021-01-28 RX ORDER — NORETHINDRONE ACETATE AND ETHINYL ESTRADIOL AND FERROUS FUMARATE 1MG-20(24)
1 KIT ORAL DAILY
Qty: 28 TABLET | Refills: 12 | Status: SHIPPED | OUTPATIENT
Start: 2021-01-28 | End: 2021-06-16

## 2021-01-28 NOTE — PROGRESS NOTES
Subjective   Ana Martinez is a 35 y.o. here for heavy, painful periods    LMP: 1/6/2021  BC: tubal ligation  Pap: 9/10/2020, [facility]    Ana c/o of heavy periods; periods las for 4-6 days with 2 days of heavy flow. Reports she saturates a tampon in 30 minutes and bleeds through her pads and clothes. Desires treatment but expresses wanting to avoid hormones. She also c/o of recurrent BV; has tried boric acid and metrogel on numerous occasions with temporary relief. Has a new sexual partner. Declines STI screening via bloodwork; donates plasma and reports she gets tested frequently.     Gynecologic Exam  The patient's primary symptoms include vaginal discharge. The patient's pertinent negatives include no genital itching, genital lesions, genital odor, genital rash, missed menses, pelvic pain or vaginal bleeding. This is a recurrent problem. The current episode started in the past 7 days. The problem occurs every several days. The problem has been waxing and waning. The patient is experiencing no pain. Pertinent negatives include no abdominal pain, chills, constipation, diarrhea, dysuria, fever, frequency, nausea, rash or sore throat. Vaginal discharge characteristics: foul-smelling. There has been no bleeding. Treatments tried: Boric acids, metrogel, Flagyl PO. She is sexually active. It is unknown whether or not her partner has an STD. She uses tubal ligation for contraception.       The following portions of the patient's history were reviewed and updated as appropriate: allergies, current medications, past family history, past medical history, past social history, past surgical history and problem list.    Review of Systems   Constitutional: Negative for chills, fatigue, fever, unexpected weight gain and unexpected weight loss.   HENT: Negative for sneezing and sore throat.    Respiratory: Negative for shortness of breath.    Cardiovascular: Negative for chest pain and palpitations.   Gastrointestinal:  Negative for abdominal pain, constipation, diarrhea and nausea.   Endocrine: Negative for cold intolerance and heat intolerance.   Genitourinary: Positive for menstrual problem and vaginal discharge. Negative for breast discharge, breast lump, breast pain, difficulty urinating, dysuria, frequency, missed menses, pelvic pain, pelvic pressure, urinary incontinence, vaginal bleeding and vaginal pain.        Recurrent bacterial infections   Skin: Negative for rash.   Neurological: Negative for weakness and headache.   Psychiatric/Behavioral: Negative for sleep disturbance, depressed mood and stress.       Objective   Physical Exam  Vitals signs and nursing note reviewed. Exam conducted with a chaperone present.   Constitutional:       Appearance: She is well-developed.   HENT:      Head: Normocephalic.   Pulmonary:      Effort: Pulmonary effort is normal.   Genitourinary:     General: Normal vulva.      Labia:         Right: No rash, tenderness, lesion or injury.         Left: No rash, tenderness, lesion or injury.       Vagina: No signs of injury and foreign body. Vaginal discharge present. No erythema, tenderness, bleeding, lesions or prolapsed vaginal walls.      Cervix: Normal.      Comments: One Swab collected.  Musculoskeletal: Normal range of motion.   Skin:     General: Skin is warm and dry.   Neurological:      Mental Status: She is alert and oriented to person, place, and time.   Psychiatric:         Behavior: Behavior normal.           Assessment/Plan   Diagnoses and all orders for this visit:    1. Menorrhagia with regular cycle (Primary)    2. Vaginal discharge  -     OneSwab - Kit, Vagina; Future    Other orders  -     norethindrone-ethinyl estradiol-ferrous fumarate (LOESTIN 24 FE) 1-20 MG-MCG(24) per tablet; Take 1 tablet by mouth Daily.  Dispense: 28 tablet; Refill: 12        OneSwab collected; will call patient with results. Discussed treatment options for heavy menses to include OCPS, Mirena,  endometrial blation, and hysterectomy. Pt will try OCPs today; has no contraindications to COCs. Return as needed.

## 2021-02-08 ENCOUNTER — TELEPHONE (OUTPATIENT)
Dept: OBSTETRICS AND GYNECOLOGY | Facility: CLINIC | Age: 36
End: 2021-02-08

## 2021-02-08 RX ORDER — METRONIDAZOLE 7.5 MG/G
GEL VAGINAL
Qty: 1 EACH | Refills: 0 | Status: SHIPPED | OUTPATIENT
Start: 2021-02-08 | End: 2021-06-16

## 2021-02-08 RX ORDER — FLUCONAZOLE 150 MG/1
TABLET ORAL
Qty: 2 TABLET | Refills: 0 | Status: SHIPPED | OUTPATIENT
Start: 2021-02-08 | End: 2021-06-16

## 2021-02-08 RX ORDER — AZITHROMYCIN 500 MG/1
1000 TABLET, FILM COATED ORAL ONCE
Qty: 2 TABLET | Refills: 0 | Status: SHIPPED | OUTPATIENT
Start: 2021-02-08 | End: 2021-02-08

## 2021-02-08 NOTE — TELEPHONE ENCOUNTER
----- Message from ELIZABETH Araujo sent at 2/8/2021 11:44 AM CST -----  One Swab results: Pt has two types of BV will treat with Flagyl, Chlamydia and Ureaplasma-will treat with Azithromycin. Positive for GBS, but this is a normal bacteria in the vagina and does not need treatment. Will also order Diflucan for yeast prophylaxis. Would recommended she also start on a probiotic to help promote good vaginal bacteria. Partner needs to be tested for Chlamydia and treated.  Total, I am sending 3 antibiotics. Please verify pharmacy.

## 2021-02-08 NOTE — TELEPHONE ENCOUNTER
Patient has been given all information and results at this time. Patient also requests that instead of flagyl could the metrogel be sent in because she states flagyl always makes her sick and she is never able to complete it. Patient prefers [facility] in [place]

## 2021-06-10 ENCOUNTER — TELEPHONE (OUTPATIENT)
Dept: FAMILY MEDICINE CLINIC | Facility: CLINIC | Age: 36
End: 2021-06-10

## 2021-06-10 NOTE — TELEPHONE ENCOUNTER
----- Message from Ana Martinez sent at 6/10/2021  9:46 AM CDT -----  Regarding: Complaint  Contact: [phone]  I am in severe pain and was told by the emergency room that they cannot give me pain management. I called your office Tuesday and was told you would be out of office for a week. I am wondering if there is anyone on call for you while you are out of town this week. My pain is an 8. Ibuprofen wasn't helping, And neither was the toradol. Please advise me to what I can do to get something stronger. The e.r told me I gave yo see my primary care physician.

## 2021-06-15 RX ORDER — HYDROCORTISONE 25 MG/G
1 CREAM TOPICAL 2 TIMES DAILY
Qty: 1 EACH | Refills: 3 | Status: SHIPPED | OUTPATIENT
Start: 2021-06-15 | End: 2021-11-12 | Stop reason: SDUPTHER

## 2021-06-16 ENCOUNTER — OFFICE VISIT (OUTPATIENT)
Dept: FAMILY MEDICINE CLINIC | Facility: CLINIC | Age: 36
End: 2021-06-16

## 2021-06-16 VITALS
WEIGHT: 146.5 LBS | BODY MASS INDEX: 24.41 KG/M2 | RESPIRATION RATE: 20 BRPM | HEART RATE: 81 BPM | SYSTOLIC BLOOD PRESSURE: 80 MMHG | DIASTOLIC BLOOD PRESSURE: 68 MMHG | OXYGEN SATURATION: 99 % | TEMPERATURE: 97.3 F | HEIGHT: 65 IN
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
## 2021-06-16 DIAGNOSIS — J30.2 SEASONAL ALLERGIES: Chronic | ICD-10-CM

## 2021-06-16 DIAGNOSIS — M53.3 COCCYX PAIN: Primary | ICD-10-CM

## 2021-06-16 PROCEDURE — 99214 OFFICE O/P EST MOD 30 MIN: CPT | Performed by: NURSE PRACTITIONER

## 2021-06-16 RX ORDER — KETOROLAC TROMETHAMINE 10 MG/1
TABLET, FILM COATED ORAL
COMMUNITY
Start: 2021-06-06 | End: 2021-06-16

## 2021-06-16 RX ORDER — CETIRIZINE HYDROCHLORIDE 10 MG/1
10 TABLET ORAL DAILY
Qty: 30 TABLET | Refills: 1 | Status: SHIPPED | OUTPATIENT
Start: 2021-06-16 | End: 2021-08-24

## 2021-06-16 RX ORDER — TRAMADOL HYDROCHLORIDE 50 MG/1
50 TABLET ORAL EVERY 6 HOURS PRN
Qty: 30 TABLET | Refills: 0 | Status: SHIPPED | OUTPATIENT
Start: 2021-06-16 | End: 2021-07-30 | Stop reason: SINTOL

## 2021-06-16 NOTE — PATIENT INSTRUCTIONS
Tailbone Injury  The tailbone is the small bone at the lower end of the backbone (spine). The tailbone can become injured from:  · A fall.  · Sitting to row or bike for a long time.  · Having a baby.  This type of injury can be painful. Most tailbone injuries get better on their own in 4-6 weeks.  Follow these instructions at home:  Activity  · Avoid sitting in one place for a long time.  · Wear proper pads and gear when riding a bike or rowing.  · Increase your activity as the pain allows.  · Do exercises as told by your doctor or physical therapist.  Managing pain, stiffness, and swelling  · To lessen pain:  ? Sit on a large, rubber or inflated ring or cushion.  ? Lean forward when you sit.  · If told, apply ice to the injured area.  ? Put ice in a plastic bag.  ? Place a towel between your skin and the bag.  ? Leave the ice on for 20 minutes, 2-3 times per day. Do this for the first 1-2 days.  · If told, put heat on the injured area. Do this as often as told by your doctor. Use the heat source that your doctor recommends, such as a moist heat pack or a heating pad.  ? Place a towel between your skin and the heat source.  ? Leave the heat on for 20-30 minutes.  ? Remove the heat if your skin turns bright red. This is very important if you are unable to feel pain, heat, or cold. You may have a greater risk of getting burned.  General instructions  · Take over-the-counter and prescription medicines only as told by your doctor.  · To prevent or treat trouble pooping (constipation) or pain when pooping, your doctor may suggest that you:  ? Drink enough fluid to keep your pee (urine) pale yellow.  ? Eat foods that are high in fiber. These include fresh fruits and vegetables, whole grains, and beans.  ? Limit foods that are high in fat and sugar. These include fried and sweet foods.  ? Take an over-the-counter or prescription medicine to treat trouble pooping.  · Keep all follow-up visits as told by your doctor. This is  important.  Contact a doctor if:  · Your pain gets worse.  · Pooping causes you pain.  · You cannot poop after 4 days.  · You have pain during sex.  Summary  · A tailbone injury can happen from a fall, from sitting for a long time to row or bike, or after having a baby.  · These injuries can be painful. Most tailbone injuries get better on their own in 4-6 weeks.  · Sit on a large, rubber or inflated ring or cushion to lessen pain.  · Avoid sitting in one place for a long time.  · Follow your doctor's suggestions to prevent or treat trouble pooping.  This information is not intended to replace advice given to you by your health care provider. Make sure you discuss any questions you have with your health care provider.  Document Revised: 01/15/2019 Document Reviewed: 01/15/2019  Elsevier Patient Education © 2021 [employer]

## 2021-06-16 NOTE — PROGRESS NOTES
"Chief Complaint  No chief complaint on file.    Subjective          Ana Martinez presents to [facility]    [facility]    PCP: ELIZABETH Christianson    CC: \"tail bone pain; refill of allergy meds\"    Reports fall while rollerblading on 6-3-2021.  Reports going down hill, rough/uneven pavement when trying to turn into a driveway, feet went out from under her and landed on buttocks and broke left thumbnail.  Treated pain at home with Motrin with no relief, evaluated at [facility] ER on 2021.  ER records requested and received and reviewed dated 2021.  Xrays of pelvis and lumbar spine were both negative.  Reports she was given Toradol pills, but have not helped with pain and caused GERD symptoms.  Was told by ER to f/u with PCP.  PCP has been out of office and patient was attending  in [place], so just now being re-evaluated today.  Continues to complain of coccyx pain, worse with sitting/walking.  Using donut pillow while sitting.  Took Gabapentin that her mother had while traveling for the  due to no other pain meds and helped some, but did not like the way it made her feel.  Has had intermittent numbness in buttocks region bilaterally.  Had initial bruising in buttocks area noted on pictures on her phone, but this has since resolved.      Reports hx of seasonal allergy symptoms with recent increase in symptoms.  Needing refill of Veramyst and zyrtec until she can see PCP next month.     Fall  Incident onset: 6-3-2021. Fall occurred: while rollerblading. She landed on concrete. There was no blood loss. The point of impact was the buttocks. The pain is present in the buttocks. The pain is at a severity of 6/10. The symptoms are aggravated by sitting and movement. Pertinent negatives include no abdominal pain, bowel incontinence, fever, headaches, hearing loss, hematuria, loss of consciousness, nausea, numbness, tingling, visual change or vomiting. " "Treatments tried: donut, motrin, toradol, gabapentin. Improvement on treatment: minimal relief.       Review of Systems   Constitutional: Negative.  Negative for fever.   Respiratory: Negative.    Cardiovascular: Negative.    Gastrointestinal: Negative.  Negative for abdominal pain, bowel incontinence, nausea and vomiting.   Genitourinary: Negative for hematuria.   Musculoskeletal: Positive for arthralgias (coccyx area).   Skin: Negative.         Bruising initially     Neurological: Negative for dizziness, tingling, loss of consciousness, numbness and headaches.        Objective   Vital Signs:   BP (!) 80/68 (BP Location: Right arm, Patient Position: Sitting, Cuff Size: Adult)   Pulse 81   Temp 97.3 °F (36.3 °C) (Temporal)   Resp 20   Ht 165.1 cm (65\")   Wt 66.5 kg (146 lb 8 oz)   SpO2 99%   BMI 24.38 kg/m²       Physical Exam  Vitals and nursing note reviewed.   Constitutional:       General: She is not in acute distress.     Appearance: Normal appearance. She is not ill-appearing.   HENT:      Head: Normocephalic and atraumatic.   Cardiovascular:      Rate and Rhythm: Normal rate and regular rhythm.   Pulmonary:      Effort: Pulmonary effort is normal. No respiratory distress.      Breath sounds: Normal breath sounds. No wheezing, rhonchi or rales.   Musculoskeletal:         General: Tenderness present.      Cervical back: Neck supple.      Lumbar back: Tenderness (sacral/coccyx region) and bony tenderness present. Decreased range of motion.        Back:    Skin:     General: Skin is warm and dry.      Findings: No bruising (bruising to buttocks has resolved).   Neurological:      General: No focal deficit present.      Mental Status: She is alert and oriented to person, place, and time.   Psychiatric:         Mood and Affect: Mood normal.         Thought Content: Thought content normal.          Result Review :                 Assessment and Plan    Diagnoses and all orders for this visit:    1. Coccyx " pain (Primary)  -     traMADol (ULTRAM) 50 MG tablet; Take 1 tablet by mouth Every 6 (Six) Hours As Needed for Moderate Pain .  Dispense: 30 tablet; Refill: 0    2. Fall, subsequent encounter  -     XR Sacrum & Coccyx (In Office)    3. Seasonal allergies  -     cetirizine (zyrTEC) 10 MG tablet; Take 1 tablet by mouth Daily.  Dispense: 30 tablet; Refill: 1    Other orders  -     fluticasone (VERAMYST) 27.5 MCG/SPRAY nasal spray; 2 sprays into the nostril(s) as directed by provider As Needed for Rhinitis (one spray in each nostril).  Dispense: 9.1 mL; Refill: 1    Xrays of sacrum/coccyx today--will call with results.  If inconclusive, will consider MRI.   Rx for Tramadol provided for pain #30, no RF.  Emanuel reviewed, no activity.   Continue with donut.  Recommended ice to area.     Refill of Veramyst, Zyrtec x 60 days until she can f/u with PCP    Declines need for RTW note.     See PCP or RTC if symptoms persist/worsen  See PCP for routine f/u visit and management of chronic medical conditions      This document has been electronically signed by ELIZABETH ePters on June 16, 2021 10:54 CDT,.

## 2021-06-21 DIAGNOSIS — M53.3 PAIN IN THE COCCYX: ICD-10-CM
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
## 2021-06-21 DIAGNOSIS — M53.3 SACRAL BACK PAIN: Primary | ICD-10-CM

## 2021-06-21 RX ORDER — CELECOXIB 200 MG/1
200 CAPSULE ORAL 2 TIMES DAILY
Qty: 30 CAPSULE | Refills: 0 | Status: SHIPPED | OUTPATIENT
Start: 2021-06-21 | End: 2021-07-30

## 2021-06-21 NOTE — PROGRESS NOTES
Pt returned call.pt notified of results.     Pt states that she is still in EXTREME pain and that she wanted to know that if she does the MRI and it shows something what will be done about it? She states she knows that even if it does show something there is nothing that can be done about her injury.    Also she states that she took the Tramadol and it makes her really itchy with N/V, pt would like to know if you can send something else in for her pain. She states the pain is so bad she can not sleep at night.     Please advise.

## 2021-06-21 NOTE — PROGRESS NOTES
I called pt, left vm to return call to clinic.   The vm only read off the phone number, asked for her to return call to clinic.

## 2021-06-21 NOTE — PROGRESS NOTES
Pt states she would like to do the MRI, pt states she has taken ibuprofen  mg and its not helping, pt would like to try celebrex-   Pt states if nothing shows on MRI she would like a referral to a chiropractor..

## 2021-06-30 DIAGNOSIS — M51.36 L4-L5 DISC BULGE: Primary | ICD-10-CM

## 2021-06-30 RX ORDER — METHYLPREDNISOLONE 4 MG/1
TABLET ORAL
Qty: 1 EACH | Refills: 0 | Status: SHIPPED | OUTPATIENT
Start: 2021-06-30 | End: 2021-07-30

## 2021-07-01 ENCOUNTER — TELEPHONE (OUTPATIENT)
Dept: FAMILY MEDICINE CLINIC | Facility: CLINIC | Age: 36
End: 2021-07-01

## 2021-07-01 NOTE — TELEPHONE ENCOUNTER
----- Message from Regine Pichardo MA sent at 7/1/2021  4:28 PM CDT -----  Regarding: RE: MRI results  Sent pt a mychart message  ----- Message -----  From: Yong Goldberg APRN  Sent: 7/1/2021   2:32 PM CDT  To: Regine Pichardo MA  Subject: RE: MRI results                                  I'm not sure what else to try her on.  I would recommend doing the Medrol eliseo and taking Motrin 800 mg with Tylenol 500 mg ES every 6 hours for pain.  The other option is pain management for possible injections if symptoms persist.   ----- Message -----  From: Regine Pichardo MA  Sent: 7/1/2021   2:07 PM CDT  To: ELIZABETH Massey  Subject: RE: MRI results                                  Pt notified of results. Pt states the celebrex is not helping at all. Pt states she started taking the tramadol @ night with benadryl so she could sleep. The tramadol makes her itch very bad and nauseous. Pt would like to know if there is anything else she could get for pain.   Pt advised to follow up with gavin.  ----- Message -----  From: Yong Goldberg APRN  Sent: 6/30/2021   5:59 PM CDT  To: Regine Pichardo MA  Subject: MRI results                                      There is minimal disc bulge at L4-L5 and L5-S1.  Possible tear at L5-S1.  There is no herniation. There is degenerative disc disease, but no herniation. I am sending in a Medrol pack.  She has f/u with Gavin in a month.

## 2021-07-01 NOTE — TELEPHONE ENCOUNTER
Patient called was wondering about her results from her MRI. She said if she could get a call back she would appreciate it.

## 2021-07-02 DIAGNOSIS — M53.3 PAIN IN THE COCCYX: ICD-10-CM

## 2021-07-02 DIAGNOSIS — M53.3 SACRAL BACK PAIN: ICD-10-CM
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
## 2021-07-30 ENCOUNTER — PROCEDURE VISIT (OUTPATIENT)
Dept: FAMILY MEDICINE CLINIC | Facility: CLINIC | Age: 36
End: 2021-07-30

## 2021-07-30 VITALS
SYSTOLIC BLOOD PRESSURE: 100 MMHG | OXYGEN SATURATION: 99 % | DIASTOLIC BLOOD PRESSURE: 60 MMHG | HEIGHT: 65 IN | RESPIRATION RATE: 20 BRPM | TEMPERATURE: 98 F | BODY MASS INDEX: 24.76 KG/M2 | WEIGHT: 148.6 LBS | HEART RATE: 77 BPM

## 2021-07-30 DIAGNOSIS — Z13.220 SCREENING FOR LIPOID DISORDERS: ICD-10-CM

## 2021-07-30 DIAGNOSIS — Z00.00 WELL ADULT EXAM: Primary | ICD-10-CM

## 2021-07-30 DIAGNOSIS — Z13.1 SCREENING FOR DIABETES MELLITUS: ICD-10-CM

## 2021-07-30 DIAGNOSIS — Z11.59 ENCOUNTER FOR HEPATITIS C SCREENING TEST FOR LOW RISK PATIENT: ICD-10-CM

## 2021-07-30 DIAGNOSIS — Z13.29 SCREENING FOR THYROID DISORDER: ICD-10-CM

## 2021-07-30 DIAGNOSIS — Z11.3 SCREEN FOR STD (SEXUALLY TRANSMITTED DISEASE): ICD-10-CM

## 2021-07-30 LAB
CANDIDA ALBICANS: NEGATIVE
GARDNERELLA VAGINALIS: NEGATIVE
T VAGINALIS DNA VAG QL PROBE+SIG AMP: NEGATIVE

## 2021-07-30 PROCEDURE — 87510 GARDNER VAG DNA DIR PROBE: CPT | Performed by: NURSE PRACTITIONER

## 2021-07-30 PROCEDURE — 99395 PREV VISIT EST AGE 18-39: CPT | Performed by: NURSE PRACTITIONER

## 2021-07-30 PROCEDURE — 87661 TRICHOMONAS VAGINALIS AMPLIF: CPT | Performed by: NURSE PRACTITIONER

## 2021-07-30 PROCEDURE — 87491 CHLMYD TRACH DNA AMP PROBE: CPT | Performed by: NURSE PRACTITIONER

## 2021-07-30 PROCEDURE — 87591 N.GONORRHOEAE DNA AMP PROB: CPT | Performed by: NURSE PRACTITIONER

## 2021-07-30 PROCEDURE — 87480 CANDIDA DNA DIR PROBE: CPT | Performed by: NURSE PRACTITIONER

## 2021-07-30 PROCEDURE — 87660 TRICHOMONAS VAGIN DIR PROBE: CPT | Performed by: NURSE PRACTITIONER

## 2021-07-31 LAB
C TRACH RRNA CVX QL NAA+PROBE: NEGATIVE
N GONORRHOEA RRNA SPEC QL NAA+PROBE: NEGATIVE
TRICHOMONAS VAGINALIS PCR: NEGATIVE

## 2021-08-10 ENCOUNTER — TELEPHONE (OUTPATIENT)
Dept: FAMILY MEDICINE CLINIC | Facility: CLINIC | Age: 36
End: 2021-08-10

## 2021-08-10 NOTE — TELEPHONE ENCOUNTER
----- Message from ELIZABETH Eldridge sent at 8/8/2021  9:38 PM CDT -----  Negative for gonorrhea, chlamydia, and trichomonas.

## 2021-08-16 NOTE — PROGRESS NOTES
Subjective   Ana Martinez is a 36 y.o. female.     She presents today for her annual wellness visit.  She is due for routine fasting labs.  She would also like to have STD testing performed.  Her weight is remained fairly stable.  She is up 2 pounds today in the office.  Her BMI is normal at 24.7%.  She is tolerating all her routine daily medications for allergy symptoms without side effect.  She is otherwise without any other new complaints today in the office.    Vaginal Discharge  The patient's primary symptoms include vaginal discharge. The patient's pertinent negatives include no genital itching, genital lesions, genital odor, genital rash, missed menses, pelvic pain or vaginal bleeding. The patient is experiencing no pain. Pertinent negatives include no abdominal pain, anorexia, back pain, chills, constipation, diarrhea, discolored urine, dysuria, fever, flank pain, frequency, headaches, hematuria, joint pain, joint swelling, nausea, painful intercourse, rash, sore throat, urgency or vomiting. There has been no bleeding. She has not been passing clots. She has not been passing tissue. She is sexually active. No, her partner does not have an STD. Her menstrual history has been regular.        The following portions of the patient's history were reviewed and updated as appropriate: allergies, current medications, past family history, past medical history, past social history, past surgical history and problem list.    Review of Systems   Constitutional: Negative.  Negative for chills and fever.   HENT: Negative.  Negative for sore throat.    Eyes: Negative.    Respiratory: Negative.    Cardiovascular: Negative.    Gastrointestinal: Negative.  Negative for abdominal pain, anorexia, constipation, diarrhea, nausea and vomiting.   Genitourinary: Positive for vaginal discharge. Negative for dysuria, flank pain, frequency, hematuria, missed menses, pelvic pain and urgency.   Musculoskeletal: Negative.   Negative for back pain and joint pain.   Skin: Negative.  Negative for rash.   Allergic/Immunologic: Negative.    Neurological: Negative.    Hematological: Negative.    Psychiatric/Behavioral: Negative.        Objective   Physical Exam  Vitals reviewed.   Constitutional:       General: She is not in acute distress.     Appearance: She is well-developed. She is not diaphoretic.   HENT:      Head: Normocephalic.      Right Ear: External ear normal.      Left Ear: External ear normal.      Nose: Nose normal.   Eyes:      Pupils: Pupils are equal, round, and reactive to light.   Neck:      Thyroid: No thyromegaly.      Vascular: No JVD.   Cardiovascular:      Rate and Rhythm: Normal rate and regular rhythm.      Heart sounds: No murmur heard.   No friction rub. No gallop.    Pulmonary:      Effort: Pulmonary effort is normal. No respiratory distress.      Breath sounds: Normal breath sounds. No wheezing or rales.   Musculoskeletal:         General: Normal range of motion.      Cervical back: Normal range of motion and neck supple.   Skin:     General: Skin is warm and dry.      Coloration: Skin is not pale.      Findings: No erythema or rash.   Neurological:      Mental Status: She is alert and oriented to person, place, and time.   Psychiatric:         Behavior: Behavior normal.         Thought Content: Thought content normal.         Judgment: Judgment normal.           Assessment/Plan   Diagnoses and all orders for this visit:    1. Well adult exam (Primary)  -     CBC & Differential  -     Comprehensive Metabolic Panel  -     Hemoglobin A1c  -     Lipid Panel  -     TSH  -     Vitamin D 25 Hydroxy    2. Screen for STD (sexually transmitted disease)  -     Chlamydia trachomatis, Neisseria gonorrhoeae, Trichomonas vaginalis, PCR - Swab, Cervix  -     Gardnerella vaginalis, Trichomonas vaginalis, Candida albicans, DNA - Swab, Vagina    3. Screening for diabetes mellitus  -     CBC & Differential  -     Comprehensive  Metabolic Panel  -     Hemoglobin A1c  -     Lipid Panel  -     TSH  -     Vitamin D 25 Hydroxy    4. Screening for lipoid disorders  -     CBC & Differential  -     Comprehensive Metabolic Panel  -     Hemoglobin A1c  -     Lipid Panel  -     TSH  -     Vitamin D 25 Hydroxy    5. Screening for thyroid disorder  -     CBC & Differential  -     Comprehensive Metabolic Panel  -     Hemoglobin A1c  -     Lipid Panel  -     TSH  -     Vitamin D 25 Hydroxy    6. Encounter for hepatitis C screening test for low risk patient  -     Hepatitis C Antibody               Patient's Body mass index is 24.73 kg/m². indicating that she is within normal range (BMI 18.5-24.9). No BMI management plan needed..  Anticipatory guidance provided at discharge.  Fasting labs.  We will notify her with her STD testing results when they become available.  Continue current medications.  Follow up as scheduled for routine follow up.  Follow up sooner for problems/concerns.  Patient verbalized understanding and agreement with plan of care.        This document has been electronically signed by ELIZABETH Eldridge on August 15, 2021 21:28 CDT

## 2021-08-24 ENCOUNTER — TELEMEDICINE (OUTPATIENT)
Dept: FAMILY MEDICINE CLINIC | Facility: TELEHEALTH | Age: 36
End: 2021-08-24

## 2021-08-24 DIAGNOSIS — J06.9 ACUTE URI: Primary | ICD-10-CM

## 2021-08-24 PROCEDURE — 99213 OFFICE O/P EST LOW 20 MIN: CPT | Performed by: NURSE PRACTITIONER

## 2021-08-24 RX ORDER — METHYLPREDNISOLONE 4 MG/1
TABLET ORAL
Qty: 21 TABLET | Refills: 0 | Status: SHIPPED | OUTPATIENT
Start: 2021-08-24 | End: 2023-01-12

## 2021-08-24 RX ORDER — FEXOFENADINE HCL 180 MG/1
180 TABLET ORAL DAILY
Qty: 30 TABLET | Refills: 0 | Status: SHIPPED | OUTPATIENT
Start: 2021-08-24 | End: 2022-02-14 | Stop reason: SDUPTHER

## 2021-08-24 NOTE — PROGRESS NOTES
HPI  Ana Martinez is a 36 y.o. female  presents with complaint of 3 day history of frequent sneezing, runny nose, headache, congestion, right ear pain    Denies fever, SOA, sore throat, loss of taste/smell.    Has been taking zyrtec, fluticasone and benadryl which only provides mild relief.    No known covid exposure.  Does not want covid test as she has had covid in the past and this does not feel like it.    Review of Systems   Constitutional: Negative for fever.   HENT: Positive for congestion, ear pain, postnasal drip, rhinorrhea and sinus pressure. Negative for sore throat.    Respiratory: Positive for cough (mild). Negative for shortness of breath.    Cardiovascular: Negative for chest pain.   Gastrointestinal: Positive for nausea (may be due to period). Negative for diarrhea and vomiting.       Past Medical History:   Diagnosis Date   • Anxiety    • Cold sore    • Heartburn        Family History   Problem Relation Age of Onset   • Hyperlipidemia Mother    • Obesity Mother    • Stroke Father    • Alcohol abuse Father    • ADILIA disease Father    • Heart attack Father    • Skin cancer Maternal Grandmother        Social History     Socioeconomic History   • Marital status:      Spouse name: Not on file   • Number of children: Not on file   • Years of education: Not on file   • Highest education level: Not on file   Tobacco Use   • Smoking status: Former Smoker   • Smokeless tobacco: Never Used   Substance and Sexual Activity   • Alcohol use: Yes     Comment: Occassionally   • Drug use: Yes     Types: Marijuana   • Sexual activity: Yes     Partners: Male         There were no vitals taken for this visit.    PHYSICAL EXAM  Physical Exam   Constitutional: She appears well-developed and well-nourished.   HENT:   Head: Normocephalic.   Nose: Rhinorrhea present. Right sinus exhibits maxillary sinus tenderness and frontal sinus tenderness. Left sinus exhibits maxillary sinus tenderness and frontal sinus  tenderness.   Neck: Neck normal appearance.  Pulmonary/Chest: Effort normal.   Neurological: She is alert.   Psychiatric: She has a normal mood and affect. Her speech is normal.       Diagnoses and all orders for this visit:    1. Acute URI (Primary)  -     fexofenadine (Allegra Allergy) 180 MG tablet; Take 1 tablet by mouth Daily for 30 days.  Dispense: 30 tablet; Refill: 0  -     methylPREDNISolone (MEDROL) 4 MG dose pack; Take as directed on package instructions.  Dispense: 21 tablet; Refill: 0          FOLLOW-UP  As discussed during visit with [facility], if symptoms worsen or fail to improve, follow-up with PCP/Urgent Care/Emergency Department.    Patient verbalizes understanding of medications, instructions for treatment and follow-up.    ELIZABETH Ko  08/24/2021  12:41 EDT    This visit was performed via Telehealth.  This patient has been instructed to follow-up with their primary care provider if their symptoms worsen or the treatment provided does not resolve their illness.

## 2021-08-24 NOTE — PATIENT INSTRUCTIONS

## 2021-11-12 RX ORDER — HYDROCORTISONE 25 MG/G
1 CREAM TOPICAL 2 TIMES DAILY
Qty: 1 EACH | Refills: 3 | Status: SHIPPED | OUTPATIENT
Start: 2021-11-12

## 2021-11-16 ENCOUNTER — PATIENT MESSAGE (OUTPATIENT)
Dept: FAMILY MEDICINE CLINIC | Facility: CLINIC | Age: 36
End: 2021-11-16

## 2021-11-16 DIAGNOSIS — J30.2 SEASONAL ALLERGIES: Primary | ICD-10-CM

## 2021-11-18 RX ORDER — MONTELUKAST SODIUM 10 MG/1
10 TABLET ORAL NIGHTLY
Qty: 30 TABLET | Refills: 5 | Status: SHIPPED | OUTPATIENT
Start: 2021-11-18

## 2022-01-26 ENCOUNTER — TELEPHONE (OUTPATIENT)
Dept: FAMILY MEDICINE CLINIC | Facility: CLINIC | Age: 37
End: 2022-01-26

## 2022-01-26 DIAGNOSIS — Z01.419 ENCOUNTER FOR ANNUAL ROUTINE GYNECOLOGICAL EXAMINATION: Primary | ICD-10-CM

## 2022-02-14 ENCOUNTER — OFFICE VISIT (OUTPATIENT)
Dept: OBSTETRICS AND GYNECOLOGY | Facility: CLINIC | Age: 37
End: 2022-02-14

## 2022-02-14 VITALS
DIASTOLIC BLOOD PRESSURE: 60 MMHG | WEIGHT: 140 LBS | SYSTOLIC BLOOD PRESSURE: 98 MMHG | BODY MASS INDEX: 23.32 KG/M2 | HEIGHT: 65 IN

## 2022-02-14 DIAGNOSIS — N76.0 RECURRENT VAGINITIS: ICD-10-CM

## 2022-02-14 DIAGNOSIS — N89.8 VAGINAL DISCHARGE: Primary | ICD-10-CM

## 2022-02-14 PROCEDURE — 87480 CANDIDA DNA DIR PROBE: CPT | Performed by: NURSE PRACTITIONER

## 2022-02-14 PROCEDURE — 99213 OFFICE O/P EST LOW 20 MIN: CPT | Performed by: NURSE PRACTITIONER

## 2022-02-14 PROCEDURE — 87510 GARDNER VAG DNA DIR PROBE: CPT | Performed by: NURSE PRACTITIONER

## 2022-02-14 PROCEDURE — 87660 TRICHOMONAS VAGIN DIR PROBE: CPT | Performed by: NURSE PRACTITIONER

## 2022-02-14 RX ORDER — METRONIDAZOLE 65 MG/5G
5 GEL TOPICAL ONCE
Qty: 5 G | Refills: 0 | Status: SHIPPED | OUTPATIENT
Start: 2022-02-14 | End: 2022-02-14

## 2022-02-14 RX ORDER — IBUPROFEN 800 MG/1
TABLET ORAL
COMMUNITY
Start: 2021-12-09 | End: 2023-01-12

## 2022-02-14 RX ORDER — FLUTICASONE PROPIONATE 50 MCG
SPRAY, SUSPENSION (ML) NASAL
COMMUNITY

## 2022-02-14 RX ORDER — CETIRIZINE HYDROCHLORIDE 10 MG/1
TABLET ORAL
COMMUNITY

## 2022-02-15 NOTE — PROGRESS NOTES
"Subjective   No chief complaint on file.    Ana Martinez is a 36 y.o. year old No obstetric history on file. presenting to be seen for evaluation of an abnormal vaginal discharge. The discharge is mucousy and yellow.  Her symptoms have been present for 1 month(s).  Additional she has noticed local irritation, odor and vulvar itching.    She is sexually active.  In the past 12 months there has been new sexual partners.  Condoms are not typically used.  She would like to be screened for STD's at today's exam.     Prior to the onset of symptoms she was not on systemic antibiotics.  She has not recently changed soaps/detergents/toilet tissue.  Prior to this visit, she has used nothing in an attempt to improve her symptoms.    Patient's last menstrual period was 02/05/2022 (approximate).    Current birth control method: tubal ligation.    Additional Complaints:  states that she was seen in the ER at [facility] last month for pelvic pain and was told that she had a ruptured ovarian cyst and should follow up with her GYN    The following portions of the patient's history were reviewed and updated as appropriate:problem list, current medications, allergies, past medical history and past surgical history    Review of Systems     Objective   BP 98/60   Ht 165.1 cm (65\")   Wt 63.5 kg (140 lb)   LMP 02/05/2022 (Approximate)   Breastfeeding No   BMI 23.30 kg/m²     General:  well developed; well nourished  no acute distress  appears stated age   Skin:  Not performed.   Pelvis: Not performed.  pt self collected vag panel and left urine specimen for STD screening     Lab Review   One swab and previous vag panels    Imaging   requested u/s report from [facility]         Diagnoses and all orders for this visit:    Vaginal discharge  -     Chlamydia trachomatis, Neisseria gonorrhoeae, Trichomonas vaginalis, PCR - Urine, Urine, Clean Catch  -     Gardnerella vaginalis, Trichomonas vaginalis, Candida albicans, DNA - Swab, " Vagina    Recurrent vaginitis    Other orders  -     cetirizine (zyrTEC) 10 MG tablet; cetirizine 10 mg tablet  -     fluticasone (FLONASE) 50 MCG/ACT nasal spray; fluticasone propionate 50 mcg/actuation nasal spray,suspension   USE 1 SPRAY(S) IN EACH NOSTRIL ONCE DAILY FOR 10 DAYS  -     ibuprofen (ADVIL,MOTRIN) 800 MG tablet  -     metroNIDAZOLE (Nuvessa) 1.3 % gel; Insert 5 g into the vagina 1 (One) Time for 1 dose.        New Medications Ordered This Visit   Medications   • metroNIDAZOLE (Nuvessa) 1.3 % gel     Sig: Insert 5 g into the vagina 1 (One) Time for 1 dose.     Dispense:  5 g     Refill:  0     One Swab suggested no healthy lima and several types of bacterial infections in the vagina. Encouraged her to start on a probiotic and will empirically treat for BV today while results are pending.    This note was electronically signed.    Anni Ramos, ELIZABETH  February 15, 2022

## 2022-05-02 ENCOUNTER — TELEPHONE (OUTPATIENT)
Dept: FAMILY MEDICINE CLINIC | Facility: CLINIC | Age: 37
End: 2022-05-02

## 2022-05-02 NOTE — TELEPHONE ENCOUNTER
Called patient she has a appointment on 5/3. Needed to see if she could come in at 2:00 pm for a 30 minute appointment.     Thanks!!

## 2022-09-29 RX ORDER — FLUTICASONE PROPIONATE 50 MCG
SPRAY, SUSPENSION (ML) NASAL
Qty: 16 ML | OUTPATIENT
Start: 2022-09-29

## 2022-09-29 NOTE — TELEPHONE ENCOUNTER
Rx Refill Note  Requested Prescriptions     Pending Prescriptions Disp Refills   • fluticasone (FLONASE) 50 MCG/ACT nasal spray [Pharmacy Med Name: FLUTICASONE PROP 50 MCG SPRAY] 16 mL      Sig: SPRAY TWO SPRAYS IN EACH NOSTRIL TWICE DAILY      Last office visit with prescribing clinician: 6/16/2021      Next office visit with prescribing clinician: Visit date not found   {TIP  Encounters:    PT HAS NOT BEEN SEEN IN OVER A YEAR. NO SHOW FOR LAST APPT.         Zoraida Bernard LPN  09/29/22, 08:24 CDT

## 2023-01-12 ENCOUNTER — OFFICE VISIT (OUTPATIENT)
Dept: FAMILY MEDICINE CLINIC | Facility: CLINIC | Age: 38
End: 2023-01-12
Payer: MEDICAID

## 2023-01-12 VITALS
OXYGEN SATURATION: 98 % | RESPIRATION RATE: 20 BRPM | DIASTOLIC BLOOD PRESSURE: 60 MMHG | SYSTOLIC BLOOD PRESSURE: 110 MMHG | WEIGHT: 137.5 LBS | TEMPERATURE: 98 F | BODY MASS INDEX: 22.91 KG/M2 | HEART RATE: 97 BPM | HEIGHT: 65 IN

## 2023-01-12 DIAGNOSIS — N76.0 BACTERIAL VAGINOSIS: ICD-10-CM

## 2023-01-12 DIAGNOSIS — B96.89 BACTERIAL VAGINOSIS: ICD-10-CM

## 2023-01-12 DIAGNOSIS — Z13.220 SCREENING FOR LIPOID DISORDERS: ICD-10-CM

## 2023-01-12 DIAGNOSIS — Z00.00 WELL ADULT EXAM: Primary | ICD-10-CM

## 2023-01-12 DIAGNOSIS — Z13.29 SCREENING FOR THYROID DISORDER: ICD-10-CM

## 2023-01-12 DIAGNOSIS — Z11.59 ENCOUNTER FOR HEPATITIS C SCREENING TEST FOR LOW RISK PATIENT: ICD-10-CM

## 2023-01-12 DIAGNOSIS — M54.2 CHRONIC NECK PAIN: ICD-10-CM

## 2023-01-12 DIAGNOSIS — Z13.1 SCREENING FOR DIABETES MELLITUS: ICD-10-CM

## 2023-01-12 DIAGNOSIS — R51.9 DAILY HEADACHE: ICD-10-CM

## 2023-01-12 DIAGNOSIS — G89.29 CHRONIC NECK PAIN: ICD-10-CM

## 2023-01-12 PROCEDURE — 99395 PREV VISIT EST AGE 18-39: CPT | Performed by: NURSE PRACTITIONER

## 2023-01-12 PROCEDURE — 2014F MENTAL STATUS ASSESS: CPT | Performed by: NURSE PRACTITIONER

## 2023-01-12 PROCEDURE — 3008F BODY MASS INDEX DOCD: CPT | Performed by: NURSE PRACTITIONER

## 2023-01-12 RX ORDER — TINIDAZOLE 500 MG/1
500 TABLET ORAL 2 TIMES DAILY
Qty: 14 TABLET | Refills: 0 | Status: SHIPPED | OUTPATIENT
Start: 2023-01-12 | End: 2023-01-19

## 2023-01-12 RX ORDER — TIZANIDINE 4 MG/1
4 TABLET ORAL NIGHTLY PRN
Qty: 30 TABLET | Refills: 1 | Status: SHIPPED | OUTPATIENT
Start: 2023-01-12

## 2023-01-12 RX ORDER — METHYLPREDNISOLONE 4 MG/1
TABLET ORAL
Qty: 21 TABLET | Refills: 0 | Status: SHIPPED | OUTPATIENT
Start: 2023-01-12

## 2023-01-12 NOTE — PROGRESS NOTES
Subjective   Ana Martinez is a 37 y.o. female.     History of Present Illness  She presents today for her annual wellness exam and routine follow-up on chronic medical problems.  She does have complaints of neck and upper back pain.  She reports that she has been experiencing daily headaches.  She is not sure if this is related to the neck pain.  She does plan to make an appointment with her chiropractor.  She has been taking ibuprofen for the discomfort, however, she reports that it did upset her stomach.  She has since stopped taking the ibuprofen.  She would like to have her neck and upper back evaluated today in the office.  She does have history of recurrent bacterial vaginosis.  She would like something to help with this if possible.  She did recently use metronidazole vaginal gel.  She reports that she does not have much success with this.  She is interested in trying something different.  She is due for routine fasting labs.  Her BMI is currently 22.9%.  She is otherwise without any other new complaints today in the office       The following portions of the patient's history were reviewed and updated as appropriate: allergies, current medications, past family history, past medical history, past social history, past surgical history and problem list.    Review of Systems   Constitutional: Positive for unexpected weight loss. Negative for fever.   Gastrointestinal: Positive for flatus. Negative for abdominal pain, anorexia, constipation, diarrhea, hematochezia, melena, nausea and vomiting.   Genitourinary: Negative for dysuria, frequency and hematuria.   Musculoskeletal: Positive for arthralgias, back pain, myalgias, neck pain and neck stiffness.       Objective   Physical Exam  Vitals reviewed.   Constitutional:       General: She is not in acute distress.     Appearance: She is well-developed. She is not diaphoretic.   HENT:      Head: Normocephalic.      Right Ear: External ear normal.      Left  Ear: External ear normal.      Nose: Nose normal.   Eyes:      Pupils: Pupils are equal, round, and reactive to light.   Neck:      Thyroid: No thyromegaly.      Vascular: No JVD.   Cardiovascular:      Rate and Rhythm: Normal rate and regular rhythm.      Heart sounds: No murmur heard.    No friction rub. No gallop.   Pulmonary:      Effort: Pulmonary effort is normal. No respiratory distress.      Breath sounds: Normal breath sounds. No wheezing or rales.   Musculoskeletal:      Cervical back: Neck supple. Spasms and tenderness present. Decreased range of motion.      Thoracic back: Spasms and tenderness present. Decreased range of motion.   Skin:     General: Skin is warm and dry.      Coloration: Skin is not pale.      Findings: No erythema or rash.   Neurological:      Mental Status: She is alert and oriented to person, place, and time.   Psychiatric:         Behavior: Behavior normal.         Thought Content: Thought content normal.         Judgment: Judgment normal.           Assessment & Plan   Diagnoses and all orders for this visit:    1. Well adult exam (Primary)  -     CBC & Differential  -     Comprehensive Metabolic Panel  -     Hemoglobin A1c  -     Lipid Panel  -     TSH  -     Vitamin D,25-Hydroxy  -     Hepatitis C Antibody    2. Chronic neck pain  -     XR Spine Cervical Complete 4 or 5 View  -     XR spine thoracic 3 vw  -     CBC & Differential  -     Comprehensive Metabolic Panel  -     Hemoglobin A1c  -     Lipid Panel  -     TSH  -     Vitamin D,25-Hydroxy  -     Hepatitis C Antibody  -     methylPREDNISolone (MEDROL) 4 MG dose pack; Take as directed on package instructions.  Dispense: 21 tablet; Refill: 0  -     tiZANidine (ZANAFLEX) 4 MG tablet; Take 1 tablet by mouth At Night As Needed for Muscle Spasms.  Dispense: 30 tablet; Refill: 1    3. Daily headache  -     XR Spine Cervical Complete 4 or 5 View  -     XR spine thoracic 3 vw  -     CBC & Differential  -     Comprehensive Metabolic  Panel  -     Hemoglobin A1c  -     Lipid Panel  -     TSH  -     Vitamin D,25-Hydroxy  -     Hepatitis C Antibody  -     methylPREDNISolone (MEDROL) 4 MG dose pack; Take as directed on package instructions.  Dispense: 21 tablet; Refill: 0  -     tiZANidine (ZANAFLEX) 4 MG tablet; Take 1 tablet by mouth At Night As Needed for Muscle Spasms.  Dispense: 30 tablet; Refill: 1    4. Screening for diabetes mellitus  -     CBC & Differential  -     Comprehensive Metabolic Panel  -     Hemoglobin A1c  -     Lipid Panel  -     TSH  -     Vitamin D,25-Hydroxy  -     Hepatitis C Antibody    5. Screening for lipoid disorders  -     CBC & Differential  -     Comprehensive Metabolic Panel  -     Hemoglobin A1c  -     Lipid Panel  -     TSH  -     Vitamin D,25-Hydroxy  -     Hepatitis C Antibody    6. Screening for thyroid disorder  -     CBC & Differential  -     Comprehensive Metabolic Panel  -     Hemoglobin A1c  -     Lipid Panel  -     TSH  -     Vitamin D,25-Hydroxy  -     Hepatitis C Antibody    7. Bacterial vaginosis  -     CBC & Differential  -     Comprehensive Metabolic Panel  -     Hemoglobin A1c  -     Lipid Panel  -     TSH  -     Vitamin D,25-Hydroxy  -     Hepatitis C Antibody  -     tinidazole (TINDAMAX) 500 MG tablet; Take 1 tablet by mouth 2 (Two) Times a Day for 7 days.  Dispense: 14 tablet; Refill: 0    8. Encounter for hepatitis C screening test for low risk patient  -     Hepatitis C Antibody               BMI is within normal parameters. No other follow-up for BMI required.    Anticipatory guidance provided at discharge.  Fasting labs.  X-ray following office visit.  Finish all steroids.  Zanaflex nightly as needed for muscle spasm/pain.  Tindamax 500 mg twice daily for 7 days.  Boric acid suppository nightly for 30 days.  Continue current medications.  Follow up as scheduled for routine follow up.  Follow up sooner for problems/concerns.  Patient verbalized understanding and agreement with plan of  care.        This document has been electronically signed by Viivan Sr DNP, APRN on January 12, 2023 10:52 CST    Answers for HPI/ROS submitted by the patient on 1/12/2023  What is the primary reason for your visit?: Abdominal Pain

## 2023-01-17 ENCOUNTER — TELEPHONE (OUTPATIENT)
Dept: FAMILY MEDICINE CLINIC | Facility: CLINIC | Age: 38
End: 2023-01-17
Payer: MEDICAID

## 2023-01-17 NOTE — TELEPHONE ENCOUNTER
----- Message from Vivian Sr, ATA, APRN sent at 1/17/2023  8:46 AM CST -----  Normal thoracic spine x-ray.

## 2023-01-19 ENCOUNTER — LAB (OUTPATIENT)
Dept: LAB | Facility: HOSPITAL | Age: 38
End: 2023-01-19
Payer: MEDICAID

## 2023-01-19 LAB
25(OH)D3 SERPL-MCNC: 21.5 NG/ML (ref 30–100)
BASOPHILS # BLD AUTO: 0.06 10*3/MM3 (ref 0–0.2)
BASOPHILS NFR BLD AUTO: 0.6 % (ref 0–1.5)
DEPRECATED RDW RBC AUTO: 46 FL (ref 37–54)
EOSINOPHIL # BLD AUTO: 1.4 10*3/MM3 (ref 0–0.4)
EOSINOPHIL NFR BLD AUTO: 14.9 % (ref 0.3–6.2)
ERYTHROCYTE [DISTWIDTH] IN BLOOD BY AUTOMATED COUNT: 13.5 % (ref 12.3–15.4)
HCT VFR BLD AUTO: 38.5 % (ref 34–46.6)
HCV AB SER DONR QL: NORMAL
HGB BLD-MCNC: 12.9 G/DL (ref 12–15.9)
IMM GRANULOCYTES # BLD AUTO: 0.02 10*3/MM3 (ref 0–0.05)
IMM GRANULOCYTES NFR BLD AUTO: 0.2 % (ref 0–0.5)
LYMPHOCYTES # BLD AUTO: 3.73 10*3/MM3 (ref 0.7–3.1)
LYMPHOCYTES NFR BLD AUTO: 39.7 % (ref 19.6–45.3)
MCH RBC QN AUTO: 31.5 PG (ref 26.6–33)
MCHC RBC AUTO-ENTMCNC: 33.5 G/DL (ref 31.5–35.7)
MCV RBC AUTO: 93.9 FL (ref 79–97)
MONOCYTES # BLD AUTO: 0.94 10*3/MM3 (ref 0.1–0.9)
MONOCYTES NFR BLD AUTO: 10 % (ref 5–12)
NEUTROPHILS NFR BLD AUTO: 3.25 10*3/MM3 (ref 1.7–7)
NEUTROPHILS NFR BLD AUTO: 34.6 % (ref 42.7–76)
NRBC BLD AUTO-RTO: 0 /100 WBC (ref 0–0.2)
PLATELET # BLD AUTO: 372 10*3/MM3 (ref 140–450)
PMV BLD AUTO: 11.6 FL (ref 6–12)
RBC # BLD AUTO: 4.1 10*6/MM3 (ref 3.77–5.28)
TSH SERPL DL<=0.05 MIU/L-ACNC: 6.88 UIU/ML (ref 0.27–4.2)
WBC NRBC COR # BLD: 9.4 10*3/MM3 (ref 3.4–10.8)

## 2023-01-19 PROCEDURE — 86803 HEPATITIS C AB TEST: CPT | Performed by: NURSE PRACTITIONER

## 2023-01-19 PROCEDURE — 83036 HEMOGLOBIN GLYCOSYLATED A1C: CPT | Performed by: NURSE PRACTITIONER

## 2023-01-19 PROCEDURE — 82306 VITAMIN D 25 HYDROXY: CPT | Performed by: NURSE PRACTITIONER

## 2023-01-19 PROCEDURE — 80061 LIPID PANEL: CPT | Performed by: NURSE PRACTITIONER

## 2023-01-19 PROCEDURE — 80050 GENERAL HEALTH PANEL: CPT | Performed by: NURSE PRACTITIONER

## 2023-01-20 LAB
ALBUMIN SERPL-MCNC: 4.2 G/DL (ref 3.5–5.2)
ALBUMIN/GLOB SERPL: 1.8 G/DL
ALP SERPL-CCNC: 57 U/L (ref 39–117)
ALT SERPL W P-5'-P-CCNC: 12 U/L (ref 1–33)
ANION GAP SERPL CALCULATED.3IONS-SCNC: 12 MMOL/L (ref 5–15)
AST SERPL-CCNC: 19 U/L (ref 1–32)
BILIRUB SERPL-MCNC: 0.2 MG/DL (ref 0–1.2)
BUN SERPL-MCNC: 9 MG/DL (ref 6–20)
BUN/CREAT SERPL: 15.8 (ref 7–25)
CALCIUM SPEC-SCNC: 8.9 MG/DL (ref 8.6–10.5)
CHLORIDE SERPL-SCNC: 104 MMOL/L (ref 98–107)
CHOLEST SERPL-MCNC: 159 MG/DL (ref 0–200)
CO2 SERPL-SCNC: 24 MMOL/L (ref 22–29)
CREAT SERPL-MCNC: 0.57 MG/DL (ref 0.57–1)
EGFRCR SERPLBLD CKD-EPI 2021: 121 ML/MIN/1.73
GLOBULIN UR ELPH-MCNC: 2.4 GM/DL
GLUCOSE SERPL-MCNC: 73 MG/DL (ref 65–99)
HBA1C MFR BLD: 5.1 % (ref 4.8–5.6)
HDLC SERPL-MCNC: 58 MG/DL (ref 40–60)
LDLC SERPL CALC-MCNC: 90 MG/DL (ref 0–100)
LDLC/HDLC SERPL: 1.55 {RATIO}
POTASSIUM SERPL-SCNC: 4.5 MMOL/L (ref 3.5–5.2)
PROT SERPL-MCNC: 6.6 G/DL (ref 6–8.5)
SODIUM SERPL-SCNC: 140 MMOL/L (ref 136–145)
TRIGL SERPL-MCNC: 55 MG/DL (ref 0–150)
VLDLC SERPL-MCNC: 11 MG/DL (ref 5–40)

## 2023-01-23 ENCOUNTER — TELEPHONE (OUTPATIENT)
Dept: FAMILY MEDICINE CLINIC | Facility: CLINIC | Age: 38
End: 2023-01-23
Payer: MEDICAID

## 2023-01-23 NOTE — TELEPHONE ENCOUNTER
----- Message from Vivian Sr, ATA, APRN sent at 1/20/2023  2:03 PM CST -----  TSH is elevated at 6.88.  We will need to repeat this in 6 weeks.  If the TSH is greater than 7 she will need to start on thyroid hormone replacement.  Vitamin D is low.  Recommend over-the-counter vitamin D3 5000 IU once daily.  Normal A1c.  Normal electrolytes, kidney function, and liver function.  Normal cholesterol panel.

## 2023-01-24 NOTE — TELEPHONE ENCOUNTER
Pt. Is returning a missed call. The Pt. Works all day long and was wanting to know if she dosent answer again can a detailed VM be left or a message sent through [employer].       You can call back at [phone]

## 2023-01-25 ENCOUNTER — TELEPHONE (OUTPATIENT)
Dept: FAMILY MEDICINE CLINIC | Facility: CLINIC | Age: 38
End: 2023-01-25
Payer: MEDICAID

## 2023-02-23 ENCOUNTER — TELEPHONE (OUTPATIENT)
Dept: FAMILY MEDICINE CLINIC | Facility: CLINIC | Age: 38
End: 2023-02-23

## 2023-02-23 NOTE — TELEPHONE ENCOUNTER
Patient called saying she got a message. Let her know that her lab results on her MyChart. But says that she doesn't think it was about that and would like a call back regarding the missed call ASAP phone number is [phone]

## 2023-03-29 ENCOUNTER — LAB (OUTPATIENT)
Dept: LAB | Facility: HOSPITAL | Age: 38
End: 2023-03-29
Payer: MEDICAID

## 2023-03-29 DIAGNOSIS — R94.6 ABNORMAL THYROID FUNCTION TEST: Primary | ICD-10-CM

## 2023-03-29 DIAGNOSIS — E55.9 VITAMIN D DEFICIENCY: ICD-10-CM

## 2023-03-29 LAB
25(OH)D3 SERPL-MCNC: 37.2 NG/ML (ref 30–100)
T3FREE SERPL-MCNC: 2.78 PG/ML (ref 2–4.4)
T4 FREE SERPL-MCNC: 1.1 NG/DL (ref 0.93–1.7)
TSH SERPL DL<=0.05 MIU/L-ACNC: 9.85 UIU/ML (ref 0.27–4.2)

## 2023-03-29 PROCEDURE — 86376 MICROSOMAL ANTIBODY EACH: CPT | Performed by: NURSE PRACTITIONER

## 2023-03-29 PROCEDURE — 82306 VITAMIN D 25 HYDROXY: CPT | Performed by: NURSE PRACTITIONER

## 2023-03-29 PROCEDURE — 86800 THYROGLOBULIN ANTIBODY: CPT | Performed by: NURSE PRACTITIONER

## 2023-03-29 PROCEDURE — 84439 ASSAY OF FREE THYROXINE: CPT | Performed by: NURSE PRACTITIONER

## 2023-03-29 PROCEDURE — 84481 FREE ASSAY (FT-3): CPT | Performed by: NURSE PRACTITIONER

## 2023-03-29 PROCEDURE — 84443 ASSAY THYROID STIM HORMONE: CPT | Performed by: NURSE PRACTITIONER

## 2023-03-30 LAB
THYROGLOB AB SERPL-ACNC: <1 IU/ML (ref 0–0.9)
THYROPEROXIDASE AB SERPL-ACNC: 316 IU/ML (ref 0–34)

## 2023-04-03 ENCOUNTER — TELEPHONE (OUTPATIENT)
Dept: FAMILY MEDICINE CLINIC | Facility: CLINIC | Age: 38
End: 2023-04-03
Payer: MEDICAID

## 2023-04-03 ENCOUNTER — PATIENT MESSAGE (OUTPATIENT)
Dept: FAMILY MEDICINE CLINIC | Facility: CLINIC | Age: 38
End: 2023-04-03
Payer: MEDICAID

## 2023-04-03 DIAGNOSIS — E03.9 ACQUIRED HYPOTHYROIDISM: Primary | ICD-10-CM

## 2023-04-03 RX ORDER — LEVOTHYROXINE SODIUM 0.05 MG/1
50 TABLET ORAL
Qty: 90 TABLET | Refills: 1 | Status: SHIPPED | OUTPATIENT
Start: 2023-04-03

## 2023-04-03 NOTE — TELEPHONE ENCOUNTER
----- Message from Vivian Sr, DNP, APRN sent at 4/2/2023  7:57 PM CDT -----  Thyroid antibodies remain elevated.  TSH is elevated at 9.85.  Normal vitamin D.  I would recommend that we start her on levothyroxine once daily in the morning on an empty stomach.  She should avoid eating or taking other medications for at least 30 minutes after taking her thyroid medication.  Is this something she would be willing to start?  We would need to repeat her thyroid levels in 3 months.  I would like for her to make a follow-up appointment at that time.    Pt would like to start on the Thyroid medication.

## 2023-04-03 NOTE — TELEPHONE ENCOUNTER
----- Message from Vivian Sr, ATA, APRN sent at 4/2/2023  7:57 PM CDT -----  Thyroid antibodies remain elevated.  TSH is elevated at 9.85.  Normal vitamin D.  I would recommend that we start her on levothyroxine once daily in the morning on an empty stomach.  She should avoid eating or taking other medications for at least 30 minutes after taking her thyroid medication.  Is this something she would be willing to start?  We would need to repeat her thyroid levels in 3 months.  I would like for her to make a follow-up appointment at that time.

## 2023-05-04 ENCOUNTER — TELEPHONE (OUTPATIENT)
Dept: FAMILY MEDICINE CLINIC | Facility: CLINIC | Age: 38
End: 2023-05-04
Payer: MEDICAID

## 2023-05-04 NOTE — TELEPHONE ENCOUNTER
Patient called stating that thyroid medication she is on is causing her not to have an appetite.   She is needing to know if the dosage can be lowered or any suggestions from the provider.     Please return call at [phone]

## 2023-05-11 NOTE — TELEPHONE ENCOUNTER
Is she taking it first thing in the morning on an empty stomach?  She can try taking it every other day for a week or two to see if that helps and then increase to daily after that time?

## 2023-05-17 DIAGNOSIS — R51.9 DAILY HEADACHE: ICD-10-CM

## 2023-05-17 DIAGNOSIS — G89.29 CHRONIC NECK PAIN: Chronic | ICD-10-CM

## 2023-05-17 DIAGNOSIS — M54.2 CHRONIC NECK PAIN: Chronic | ICD-10-CM

## 2023-05-17 RX ORDER — TIZANIDINE 4 MG/1
4 TABLET ORAL NIGHTLY PRN
Qty: 30 TABLET | Refills: 1 | Status: SHIPPED | OUTPATIENT
Start: 2023-05-17

## 2023-05-30 RX ORDER — HYDROCORTISONE 25 MG/G
CREAM TOPICAL
Qty: 30 G | Refills: 5 | Status: SHIPPED | OUTPATIENT
Start: 2023-05-30

## 2023-08-22 ENCOUNTER — LAB (OUTPATIENT)
Dept: LAB | Facility: HOSPITAL | Age: 38
End: 2023-08-22
Payer: MEDICAID

## 2023-08-22 PROCEDURE — 84439 ASSAY OF FREE THYROXINE: CPT | Performed by: NURSE PRACTITIONER

## 2023-08-22 PROCEDURE — 84481 FREE ASSAY (FT-3): CPT | Performed by: NURSE PRACTITIONER

## 2023-08-22 PROCEDURE — 84443 ASSAY THYROID STIM HORMONE: CPT | Performed by: NURSE PRACTITIONER

## 2023-08-24 ENCOUNTER — TELEPHONE (OUTPATIENT)
Dept: FAMILY MEDICINE CLINIC | Facility: CLINIC | Age: 38
End: 2023-08-24
Payer: MEDICAID

## 2023-08-24 NOTE — TELEPHONE ENCOUNTER
----- Message from Vivian Sr, ATA, APRN sent at 8/23/2023  8:16 AM CDT -----  Thyroid is well controlled.  Continue current medication dosage.

## 2023-09-25 DIAGNOSIS — M54.2 CHRONIC NECK PAIN: Chronic | ICD-10-CM

## 2023-09-25 DIAGNOSIS — G89.29 CHRONIC NECK PAIN: Chronic | ICD-10-CM

## 2023-09-26 RX ORDER — TIZANIDINE 4 MG/1
8 TABLET ORAL NIGHTLY PRN
Qty: 30 TABLET | Refills: 1 | Status: SHIPPED | OUTPATIENT
Start: 2023-09-26

## 2024-12-28 NOTE — TELEPHONE ENCOUNTER
Rx Refill Note  Requested Prescriptions     Pending Prescriptions Disp Refills   • fluticasone (VERAMYST) 27.5 MCG/SPRAY nasal spray 9.1 mL 1     Si sprays into the nostril(s) as directed by provider As Needed for Rhinitis (one spray in each nostril).      Last office visit with prescribing clinician: 2021      Next office visit with prescribing clinician: Visit date not found   {TIP  Encounters:    PT SAW BRE ON 21. LAST TIME SEEN BY YOU WAS 2020. PT HAD AN APPT SCHEDULED FOR 22 AND 22 BUT WERE BOTH CANCELLED BY THE OFFICE.    {TIP  Please add Last Relevant Lab Date if appropriate  {TIP  Is Refill Pharmacy correct? YES  Zoraida Bernard LPN  22, 09:13 CST   [phone]